# Patient Record
Sex: MALE | Race: WHITE | NOT HISPANIC OR LATINO | Employment: OTHER | URBAN - METROPOLITAN AREA
[De-identification: names, ages, dates, MRNs, and addresses within clinical notes are randomized per-mention and may not be internally consistent; named-entity substitution may affect disease eponyms.]

---

## 2018-05-01 ENCOUNTER — HOSPITAL ENCOUNTER (OUTPATIENT)
Dept: MEDSURG UNIT | Facility: HOSPITAL | Age: 83
End: 2018-05-03
Attending: INTERNAL MEDICINE | Admitting: INTERNAL MEDICINE

## 2018-05-01 LAB
ABS NEUT (OLG): 7.26 X10(3)/MCL (ref 2.1–9.2)
ALBUMIN SERPL-MCNC: 3.4 GM/DL (ref 3.4–5)
ALBUMIN/GLOB SERPL: 1 {RATIO}
ALP SERPL-CCNC: 117 UNIT/L (ref 45–117)
ALT SERPL-CCNC: 15 UNIT/L (ref 16–61)
APPEARANCE, UA: CLEAR
AST SERPL-CCNC: 20 UNIT/L (ref 15–37)
BACTERIA SPEC CULT: ABNORMAL
BASOPHILS # BLD AUTO: 0.08 X10(3)/MCL (ref 0–0.2)
BASOPHILS NFR BLD AUTO: 0.8 % (ref 0–0.9)
BILIRUB SERPL-MCNC: 0.6 MG/DL (ref 0.2–1)
BILIRUB UR QL STRIP: NEGATIVE
BILIRUBIN DIRECT+TOT PNL SERPL-MCNC: 0.2 MG/DL (ref 0–0.2)
BILIRUBIN DIRECT+TOT PNL SERPL-MCNC: 0.4 MG/DL (ref 0–1)
BUN SERPL-MCNC: 40 MG/DL (ref 7–18)
CALCIUM SERPL-MCNC: 8.6 MG/DL (ref 8.5–10.1)
CHLORIDE SERPL-SCNC: 106 MMOL/L (ref 98–107)
CO2 SERPL-SCNC: 26 MMOL/L (ref 21–32)
COLOR UR: YELLOW
CREAT SERPL-MCNC: 1.69 MG/DL (ref 0.7–1.3)
EOSINOPHIL # BLD AUTO: 0.33 X10(3)/MCL (ref 0–0.9)
EOSINOPHIL NFR BLD AUTO: 3.2 % (ref 0–6.5)
ERYTHROCYTE [DISTWIDTH] IN BLOOD BY AUTOMATED COUNT: 13.3 % (ref 11.5–17)
FLUAV AG NPH QL IA: NEGATIVE
FLUBV AG NPH QL IA: NEGATIVE
GLOBULIN SER-MCNC: 4 GM/DL (ref 2–4)
GLUCOSE (UA): NEGATIVE
GLUCOSE SERPL-MCNC: 88 MG/DL (ref 74–106)
HCT VFR BLD AUTO: 35.3 % (ref 42–52)
HGB BLD-MCNC: 11 GM/DL (ref 14–18)
HGB UR QL STRIP: NEGATIVE
IMM GRANULOCYTES # BLD AUTO: 0.06 10*3/UL (ref 0–0.02)
IMM GRANULOCYTES NFR BLD AUTO: 0.6 % (ref 0–0.43)
KETONES UR QL STRIP: NEGATIVE
LACTATE SERPL-SCNC: 1.2 MMOL/L (ref 0.4–2)
LEUKOCYTE ESTERASE UR QL STRIP: NEGATIVE
LIPASE SERPL-CCNC: 134 UNIT/L (ref 73–393)
LYMPHOCYTES # BLD AUTO: 2.16 X10(3)/MCL (ref 0.6–4.6)
LYMPHOCYTES NFR BLD AUTO: 21.2 % (ref 16.2–38.3)
MCH RBC QN AUTO: 30.1 PG (ref 27–31)
MCHC RBC AUTO-ENTMCNC: 31.2 GM/DL (ref 33–36)
MCV RBC AUTO: 96.7 FL (ref 80–94)
MONOCYTES # BLD AUTO: 0.32 X10(3)/MCL (ref 0.1–1.3)
MONOCYTES NFR BLD AUTO: 3.1 % (ref 4.7–11.3)
NEUTROPHILS # BLD AUTO: 7.26 X10(3)/MCL (ref 2.1–9.2)
NEUTROPHILS NFR BLD AUTO: 71.1 % (ref 49.1–73.4)
NITRITE UR QL STRIP: NEGATIVE
NRBC BLD AUTO-RTO: 0 % (ref 0–0.2)
PH UR STRIP: 5.5 [PH] (ref 5–7)
PLATELET # BLD AUTO: 315 X10(3)/MCL (ref 130–400)
PMV BLD AUTO: 10.6 FL (ref 7.4–10.4)
POTASSIUM SERPL-SCNC: 4.4 MMOL/L (ref 3.5–5.1)
PROT SERPL-MCNC: 7.5 GM/DL (ref 6.4–8.2)
PROT UR QL STRIP: ABNORMAL
RBC # BLD AUTO: 3.65 X10(6)/MCL (ref 4.7–6.1)
RBC #/AREA URNS HPF: ABNORMAL /HPF
SODIUM SERPL-SCNC: 139 MMOL/L (ref 136–145)
SP GR UR STRIP: 1.02 (ref 1–1.03)
SQUAMOUS EPITHELIAL, UA: ABNORMAL /LPF
UROBILINOGEN UR STRIP-ACNC: NEGATIVE
WBC # SPEC AUTO: 10.2 X10(3)/MCL (ref 4.5–11.5)
WBC #/AREA URNS HPF: 0 /[HPF]

## 2018-05-03 LAB
ABS NEUT (OLG): 3.59 X10(3)/MCL (ref 2.1–9.2)
BASOPHILS # BLD AUTO: 0.1 X10(3)/MCL (ref 0–0.2)
BASOPHILS NFR BLD AUTO: 1 %
BUN SERPL-MCNC: 24 MG/DL (ref 7–18)
CALCIUM SERPL-MCNC: 8.4 MG/DL (ref 8.5–10.1)
CHLORIDE SERPL-SCNC: 111 MMOL/L (ref 98–107)
CO2 SERPL-SCNC: 25 MMOL/L (ref 21–32)
CREAT SERPL-MCNC: 1.16 MG/DL (ref 0.7–1.3)
CREAT/UREA NIT SERPL: 20.7
EOSINOPHIL # BLD AUTO: 0.3 X10(3)/MCL (ref 0–0.9)
EOSINOPHIL NFR BLD AUTO: 5 %
ERYTHROCYTE [DISTWIDTH] IN BLOOD BY AUTOMATED COUNT: 13.6 % (ref 11.5–17)
GLUCOSE SERPL-MCNC: 91 MG/DL (ref 74–106)
HCT VFR BLD AUTO: 32.3 % (ref 42–52)
HGB BLD-MCNC: 10.2 GM/DL (ref 14–18)
LYMPHOCYTES # BLD AUTO: 1.3 X10(3)/MCL (ref 0.6–4.6)
LYMPHOCYTES NFR BLD AUTO: 22 %
MCH RBC QN AUTO: 30.9 PG (ref 27–31)
MCHC RBC AUTO-ENTMCNC: 31.6 GM/DL (ref 33–36)
MCV RBC AUTO: 97.9 FL (ref 80–94)
MONOCYTES # BLD AUTO: 0.7 X10(3)/MCL (ref 0.1–1.3)
MONOCYTES NFR BLD AUTO: 12 %
NEUTROPHILS # BLD AUTO: 3.59 X10(3)/MCL (ref 1.4–7.9)
NEUTROPHILS NFR BLD AUTO: 59 %
PLATELET # BLD AUTO: 290 X10(3)/MCL (ref 130–400)
PMV BLD AUTO: 10.2 FL (ref 9.4–12.4)
POTASSIUM SERPL-SCNC: 4.9 MMOL/L (ref 3.5–5.1)
RBC # BLD AUTO: 3.3 X10(6)/MCL (ref 4.7–6.1)
SODIUM SERPL-SCNC: 142 MMOL/L (ref 136–145)
WBC # SPEC AUTO: 6 X10(3)/MCL (ref 4.5–11.5)

## 2018-05-04 LAB — FINAL CULTURE: NORMAL

## 2018-05-08 LAB
FINAL CULTURE: NORMAL
FINAL CULTURE: NORMAL

## 2018-12-05 ENCOUNTER — HISTORICAL (OUTPATIENT)
Dept: ADMINISTRATIVE | Facility: HOSPITAL | Age: 83
End: 2018-12-05

## 2018-12-05 LAB
ABS NEUT (OLG): 5.22 X10(3)/MCL (ref 2.1–9.2)
ALBUMIN SERPL-MCNC: 3.8 GM/DL (ref 3.4–5)
ALBUMIN/GLOB SERPL: 1.4 {RATIO}
ALP SERPL-CCNC: 112 UNIT/L (ref 50–136)
ALT SERPL-CCNC: 18 UNIT/L (ref 12–78)
AST SERPL-CCNC: 20 UNIT/L (ref 15–37)
BASOPHILS # BLD AUTO: 0.1 X10(3)/MCL (ref 0–0.2)
BASOPHILS NFR BLD AUTO: 1 %
BILIRUB SERPL-MCNC: 0.7 MG/DL (ref 0.2–1)
BILIRUBIN DIRECT+TOT PNL SERPL-MCNC: 0.2 MG/DL (ref 0–0.2)
BILIRUBIN DIRECT+TOT PNL SERPL-MCNC: 0.5 MG/DL (ref 0–0.8)
BUN SERPL-MCNC: 19 MG/DL (ref 7–18)
CALCIUM SERPL-MCNC: 8.8 MG/DL (ref 8.5–10.1)
CHLORIDE SERPL-SCNC: 104 MMOL/L (ref 98–107)
CHOLEST SERPL-MCNC: 148 MG/DL (ref 0–200)
CHOLEST/HDLC SERPL: 2.7 {RATIO} (ref 0–5)
CO2 SERPL-SCNC: 28 MMOL/L (ref 21–32)
CREAT SERPL-MCNC: 1.15 MG/DL (ref 0.7–1.3)
EOSINOPHIL # BLD AUTO: 0.3 X10(3)/MCL (ref 0–0.9)
EOSINOPHIL NFR BLD AUTO: 3 %
ERYTHROCYTE [DISTWIDTH] IN BLOOD BY AUTOMATED COUNT: 13.9 % (ref 11.5–17)
GLOBULIN SER-MCNC: 2.8 GM/DL (ref 2.4–3.5)
GLUCOSE SERPL-MCNC: 87 MG/DL (ref 74–106)
HCT VFR BLD AUTO: 39.7 % (ref 42–52)
HDLC SERPL-MCNC: 55 MG/DL (ref 35–60)
HGB BLD-MCNC: 12 GM/DL (ref 14–18)
LDLC SERPL CALC-MCNC: 72 MG/DL (ref 0–129)
LYMPHOCYTES # BLD AUTO: 2.2 X10(3)/MCL (ref 0.6–4.6)
LYMPHOCYTES NFR BLD AUTO: 26 %
MCH RBC QN AUTO: 29.6 PG (ref 27–31)
MCHC RBC AUTO-ENTMCNC: 30.2 GM/DL (ref 33–36)
MCV RBC AUTO: 97.8 FL (ref 80–94)
MONOCYTES # BLD AUTO: 0.7 X10(3)/MCL (ref 0.1–1.3)
MONOCYTES NFR BLD AUTO: 8 %
NEUTROPHILS # BLD AUTO: 5.22 X10(3)/MCL (ref 2.1–9.2)
NEUTROPHILS NFR BLD AUTO: 61 %
PLATELET # BLD AUTO: 348 X10(3)/MCL (ref 130–400)
PMV BLD AUTO: 10.6 FL (ref 9.4–12.4)
POTASSIUM SERPL-SCNC: 5.6 MMOL/L (ref 3.5–5.1)
PROT SERPL-MCNC: 6.6 GM/DL (ref 6.4–8.2)
PSA SERPL-MCNC: <0.01 NG/ML (ref 0–4)
RBC # BLD AUTO: 4.06 X10(6)/MCL (ref 4.7–6.1)
SODIUM SERPL-SCNC: 140 MMOL/L (ref 136–145)
TRIGL SERPL-MCNC: 106 MG/DL (ref 30–150)
VLDLC SERPL CALC-MCNC: 21 MG/DL
WBC # SPEC AUTO: 8.5 X10(3)/MCL (ref 4.5–11.5)

## 2019-07-09 ENCOUNTER — HOSPITAL ENCOUNTER (OUTPATIENT)
Dept: ONCOLOGY | Facility: HOSPITAL | Age: 84
End: 2019-07-12
Attending: INTERNAL MEDICINE | Admitting: INTERNAL MEDICINE

## 2019-07-09 LAB
ABS NEUT (OLG): 8.58 X10(3)/MCL (ref 2.1–9.2)
ALBUMIN SERPL-MCNC: 3.6 GM/DL (ref 3.4–5)
ALBUMIN/GLOB SERPL: 1.2 {RATIO}
ALP SERPL-CCNC: 95 UNIT/L (ref 50–136)
ALT SERPL-CCNC: 20 UNIT/L (ref 12–78)
AST SERPL-CCNC: 20 UNIT/L (ref 15–37)
BASOPHILS # BLD AUTO: 0.1 X10(3)/MCL (ref 0–0.2)
BASOPHILS NFR BLD AUTO: 1 %
BILIRUB SERPL-MCNC: 0.3 MG/DL (ref 0.2–1)
BILIRUBIN DIRECT+TOT PNL SERPL-MCNC: 0.1 MG/DL (ref 0–0.8)
BILIRUBIN DIRECT+TOT PNL SERPL-MCNC: 0.2 MG/DL (ref 0–0.2)
BUN SERPL-MCNC: 23 MG/DL (ref 7–18)
CALCIUM SERPL-MCNC: 8.6 MG/DL (ref 8.5–10.1)
CHLORIDE SERPL-SCNC: 104 MMOL/L (ref 98–107)
CO2 SERPL-SCNC: 27 MMOL/L (ref 21–32)
CREAT SERPL-MCNC: 1.45 MG/DL (ref 0.7–1.3)
EOSINOPHIL # BLD AUTO: 0.6 X10(3)/MCL (ref 0–0.9)
EOSINOPHIL NFR BLD AUTO: 5 %
ERYTHROCYTE [DISTWIDTH] IN BLOOD BY AUTOMATED COUNT: 14.2 % (ref 11.5–17)
GLOBULIN SER-MCNC: 3 GM/DL (ref 2.4–3.5)
GLUCOSE SERPL-MCNC: 107 MG/DL (ref 74–106)
HCT VFR BLD AUTO: 40 % (ref 42–52)
HGB BLD-MCNC: 12.3 GM/DL (ref 14–18)
LYMPHOCYTES # BLD AUTO: 1.4 X10(3)/MCL (ref 0.6–4.6)
LYMPHOCYTES NFR BLD AUTO: 12 %
MCH RBC QN AUTO: 30.4 PG (ref 27–31)
MCHC RBC AUTO-ENTMCNC: 30.8 GM/DL (ref 33–36)
MCV RBC AUTO: 99 FL (ref 80–94)
MONOCYTES # BLD AUTO: 1 X10(3)/MCL (ref 0.1–1.3)
MONOCYTES NFR BLD AUTO: 8 %
NEUTROPHILS # BLD AUTO: 8.58 X10(3)/MCL (ref 2.1–9.2)
NEUTROPHILS NFR BLD AUTO: 72 %
PLATELET # BLD AUTO: 328 X10(3)/MCL (ref 130–400)
PMV BLD AUTO: 10.7 FL (ref 9.4–12.4)
POTASSIUM SERPL-SCNC: 4.7 MMOL/L (ref 3.5–5.1)
PROT SERPL-MCNC: 6.6 GM/DL (ref 6.4–8.2)
RBC # BLD AUTO: 4.04 X10(6)/MCL (ref 4.7–6.1)
SODIUM SERPL-SCNC: 138 MMOL/L (ref 136–145)
WBC # SPEC AUTO: 11.8 X10(3)/MCL (ref 4.5–11.5)

## 2019-07-10 LAB
APTT PPP: 30 SECOND(S) (ref 24.2–33.9)
INR PPP: 1.1 (ref 0–1.3)
PROTHROMBIN TIME: 14.7 SECOND(S) (ref 12–14)

## 2019-07-11 LAB
ABS NEUT (OLG): 5.83 X10(3)/MCL (ref 2.1–9.2)
ALBUMIN SERPL-MCNC: 3.1 GM/DL (ref 3.4–5)
ALBUMIN/GLOB SERPL: 1.1 RATIO (ref 1.1–2)
ALP SERPL-CCNC: 88 UNIT/L (ref 50–136)
ALT SERPL-CCNC: 15 UNIT/L (ref 12–78)
AST SERPL-CCNC: 15 UNIT/L (ref 15–37)
BASOPHILS # BLD AUTO: 0.1 X10(3)/MCL (ref 0–0.2)
BASOPHILS NFR BLD AUTO: 1 %
BILIRUB SERPL-MCNC: 0.4 MG/DL (ref 0.2–1)
BILIRUBIN DIRECT+TOT PNL SERPL-MCNC: 0.2 MG/DL (ref 0–0.5)
BILIRUBIN DIRECT+TOT PNL SERPL-MCNC: 0.2 MG/DL (ref 0–0.8)
BUN SERPL-MCNC: 25 MG/DL (ref 7–18)
CALCIUM SERPL-MCNC: 8.8 MG/DL (ref 8.5–10.1)
CHLORIDE SERPL-SCNC: 104 MMOL/L (ref 98–107)
CO2 SERPL-SCNC: 30 MMOL/L (ref 21–32)
CREAT SERPL-MCNC: 1.21 MG/DL (ref 0.7–1.3)
EOSINOPHIL # BLD AUTO: 0.6 X10(3)/MCL (ref 0–0.9)
EOSINOPHIL NFR BLD AUTO: 6 %
ERYTHROCYTE [DISTWIDTH] IN BLOOD BY AUTOMATED COUNT: 14.1 % (ref 11.5–17)
GLOBULIN SER-MCNC: 2.8 GM/DL (ref 2.4–3.5)
GLUCOSE SERPL-MCNC: 91 MG/DL (ref 74–106)
HCT VFR BLD AUTO: 40.1 % (ref 42–52)
HGB BLD-MCNC: 12 GM/DL (ref 14–18)
LYMPHOCYTES # BLD AUTO: 1.7 X10(3)/MCL (ref 0.6–4.6)
LYMPHOCYTES NFR BLD AUTO: 19 %
MCH RBC QN AUTO: 29.8 PG (ref 27–31)
MCHC RBC AUTO-ENTMCNC: 29.9 GM/DL (ref 33–36)
MCV RBC AUTO: 99.5 FL (ref 80–94)
MONOCYTES # BLD AUTO: 0.9 X10(3)/MCL (ref 0.1–1.3)
MONOCYTES NFR BLD AUTO: 10 %
NEUTROPHILS # BLD AUTO: 5.83 X10(3)/MCL (ref 2.1–9.2)
NEUTROPHILS NFR BLD AUTO: 63 %
PLATELET # BLD AUTO: 305 X10(3)/MCL (ref 130–400)
PMV BLD AUTO: 10.5 FL (ref 9.4–12.4)
POTASSIUM SERPL-SCNC: 5.5 MMOL/L (ref 3.5–5.1)
PROT SERPL-MCNC: 5.9 GM/DL (ref 6.4–8.2)
RBC # BLD AUTO: 4.03 X10(6)/MCL (ref 4.7–6.1)
SODIUM SERPL-SCNC: 140 MMOL/L (ref 136–145)
WBC # SPEC AUTO: 9.2 X10(3)/MCL (ref 4.5–11.5)

## 2020-03-05 ENCOUNTER — HISTORICAL (OUTPATIENT)
Dept: ADMINISTRATIVE | Facility: HOSPITAL | Age: 85
End: 2020-03-05

## 2020-03-05 LAB
ABS NEUT (OLG): 5.49 X10(3)/MCL (ref 2.1–9.2)
BASOPHILS # BLD AUTO: 0.2 X10(3)/MCL (ref 0–0.2)
BASOPHILS NFR BLD AUTO: 1.7 %
EOSINOPHIL # BLD AUTO: 0.6 X10(3)/MCL (ref 0–0.9)
EOSINOPHIL NFR BLD AUTO: 6.1 %
ERYTHROCYTE [DISTWIDTH] IN BLOOD BY AUTOMATED COUNT: 13.7 % (ref 11.5–17)
FERRITIN SERPL-MCNC: 175.5 NG/ML (ref 8–388)
HCT VFR BLD AUTO: 39.7 % (ref 42–52)
HGB BLD-MCNC: 12.2 GM/DL (ref 14–18)
IRON SATN MFR SERPL: 20.4 % (ref 20–50)
IRON SERPL-MCNC: 60 MCG/DL (ref 50–175)
LYMPHOCYTES # BLD AUTO: 2.5 X10(3)/MCL (ref 0.6–4.6)
LYMPHOCYTES NFR BLD AUTO: 25.6 %
MCH RBC QN AUTO: 30 PG (ref 27–31)
MCHC RBC AUTO-ENTMCNC: 30.7 GM/DL (ref 33–36)
MCV RBC AUTO: 97.8 FL (ref 80–94)
MONOCYTES # BLD AUTO: 1 X10(3)/MCL (ref 0.1–1.3)
MONOCYTES NFR BLD AUTO: 9.9 %
NEUTROPHILS # BLD AUTO: 5.5 X10(3)/MCL (ref 2.1–9.2)
NEUTROPHILS NFR BLD AUTO: 55.9 %
PLATELET # BLD AUTO: 434 X10(3)/MCL (ref 130–400)
PMV BLD AUTO: 9.1 FL (ref 9.4–12.4)
RBC # BLD AUTO: 4.06 X10(6)/MCL (ref 4.7–6.1)
TIBC SERPL-MCNC: 294 MCG/DL (ref 250–450)
TRANSFERRIN SERPL-MCNC: 213 MG/DL (ref 200–360)
WBC # SPEC AUTO: 9.8 X10(3)/MCL (ref 4.5–11.5)

## 2020-10-07 ENCOUNTER — HISTORICAL (OUTPATIENT)
Dept: ADMINISTRATIVE | Facility: HOSPITAL | Age: 85
End: 2020-10-07

## 2020-10-07 LAB
ALBUMIN SERPL-MCNC: 4.6 G/DL (ref 3.6–4.6)
ALBUMIN/GLOB SERPL: 1.9 {RATIO} (ref 1.2–2.2)
ALP SERPL-CCNC: 148 IU/L (ref 39–117)
ALT SERPL-CCNC: 11 IU/L (ref 0–44)
AST SERPL-CCNC: 18 IU/L (ref 0–40)
BASOPHILS # BLD AUTO: 0.1 X10E3/UL (ref 0–0.2)
BASOPHILS NFR BLD AUTO: 2 %
BILIRUB SERPL-MCNC: 0.4 MG/DL (ref 0–1.2)
BUN SERPL-MCNC: 30 MG/DL (ref 8–27)
CALCIUM SERPL-MCNC: 9.2 MG/DL (ref 8.6–10.2)
CHLORIDE SERPL-SCNC: 99 MMOL/L (ref 96–106)
CO2 SERPL-SCNC: 21 MMOL/L (ref 20–29)
CREAT SERPL-MCNC: 1.37 MG/DL (ref 0.76–1.27)
CREAT/UREA NIT SERPL: 22 (ref 10–24)
EOSINOPHIL # BLD AUTO: 0.6 X10E3/UL (ref 0–0.4)
EOSINOPHIL NFR BLD AUTO: 7 %
ERYTHROCYTE [DISTWIDTH] IN BLOOD BY AUTOMATED COUNT: 14.4 % (ref 11.6–15.4)
GLOBULIN SER-MCNC: 2.4 G/DL (ref 1.5–4.5)
GLUCOSE SERPL-MCNC: 96 MG/DL (ref 65–99)
HCT VFR BLD AUTO: 39.6 % (ref 37.5–51)
HGB BLD-MCNC: 12.5 G/DL (ref 13–17.7)
LYMPHOCYTES # BLD AUTO: 2.5 X10E3/UL (ref 0.7–3.1)
LYMPHOCYTES NFR BLD AUTO: 28 %
MCH RBC QN AUTO: 30.8 PG (ref 26.6–33)
MCHC RBC AUTO-ENTMCNC: 31.6 G/DL (ref 31.5–35.7)
MCV RBC AUTO: 98 FL (ref 79–97)
MONOCYTES # BLD AUTO: 1.1 X10E3/UL (ref 0.1–0.9)
MONOCYTES NFR BLD AUTO: 12 %
NEUTROPHILS # BLD AUTO: 4.6 X10E3/UL (ref 1.4–7)
NEUTROPHILS NFR BLD AUTO: 51 %
PLATELET # BLD AUTO: 734 X10E3/UL (ref 150–450)
POTASSIUM SERPL-SCNC: 6.1 MMOL/L (ref 3.5–5.2)
PROT SERPL-MCNC: 7 G/DL (ref 6–8.5)
RBC # BLD AUTO: 4.06 X10(6)/MCL (ref 4.14–5.8)
SODIUM SERPL-SCNC: 135 MMOL/L (ref 134–144)
WBC # SPEC AUTO: 9 X10E3/UL (ref 3.4–10.8)

## 2020-10-20 ENCOUNTER — HISTORICAL (OUTPATIENT)
Dept: ADMINISTRATIVE | Facility: HOSPITAL | Age: 85
End: 2020-10-20

## 2020-10-20 LAB
BUN SERPL-MCNC: 26.4 MG/DL (ref 8.4–25.7)
CALCIUM SERPL-MCNC: 8.6 MG/DL (ref 8.8–10)
CHLORIDE SERPL-SCNC: 102 MMOL/L (ref 98–107)
CO2 SERPL-SCNC: 21 MMOL/L (ref 23–31)
CREAT SERPL-MCNC: 1.3 MG/DL (ref 0.72–1.25)
CREAT/UREA NIT SERPL: 20
GLUCOSE SERPL-MCNC: 129 MG/DL (ref 82–115)
POTASSIUM SERPL-SCNC: 6 MMOL/L (ref 3.5–5.1)
SODIUM SERPL-SCNC: 132 MMOL/L (ref 136–145)

## 2020-10-21 ENCOUNTER — HISTORICAL (OUTPATIENT)
Dept: RADIOLOGY | Facility: HOSPITAL | Age: 85
End: 2020-10-21

## 2020-10-21 LAB
BUN SERPL-MCNC: 27 MG/DL (ref 8.4–25.7)
CALCIUM SERPL-MCNC: 8.8 MG/DL (ref 8.8–10)
CHLORIDE SERPL-SCNC: 99 MMOL/L (ref 98–107)
CO2 SERPL-SCNC: 24 MMOL/L (ref 23–31)
CREAT SERPL-MCNC: 1.19 MG/DL (ref 0.73–1.18)
CREAT/UREA NIT SERPL: 23
GLUCOSE SERPL-MCNC: 73 MG/DL (ref 82–115)
POTASSIUM SERPL-SCNC: 6.1 MMOL/L (ref 3.5–5.1)
SODIUM SERPL-SCNC: 133 MMOL/L (ref 136–145)

## 2020-10-22 ENCOUNTER — HISTORICAL (OUTPATIENT)
Dept: ADMINISTRATIVE | Facility: HOSPITAL | Age: 85
End: 2020-10-22

## 2020-10-22 LAB
BILIRUB SERPL-MCNC: NORMAL MG/DL
BLOOD URINE, POC: NEGATIVE
BUN SERPL-MCNC: 30.1 MG/DL (ref 8.4–25.7)
CALCIUM SERPL-MCNC: 8.8 MG/DL (ref 8.8–10)
CHLORIDE SERPL-SCNC: 97 MMOL/L (ref 98–107)
CLARITY, POC UA: CLEAR
CO2 SERPL-SCNC: 26 MMOL/L (ref 23–31)
COLOR, POC UA: NORMAL
CREAT SERPL-MCNC: 1.27 MG/DL (ref 0.72–1.25)
CREAT/UREA NIT SERPL: 24
GLUCOSE SERPL-MCNC: 78 MG/DL (ref 82–115)
GLUCOSE UR QL STRIP: NEGATIVE
KETONES UR QL STRIP: NEGATIVE
LEUKOCYTE EST, POC UA: NEGATIVE
NITRITE, POC UA: NEGATIVE
PH, POC UA: 5.5
POTASSIUM SERPL-SCNC: 5.7 MMOL/L (ref 3.5–5.1)
PROTEIN, POC: NEGATIVE
SODIUM SERPL-SCNC: 137 MMOL/L (ref 136–145)
SPECIFIC GRAVITY, POC UA: NORMAL
UROBILINOGEN, POC UA: NORMAL

## 2020-10-23 ENCOUNTER — HISTORICAL (OUTPATIENT)
Dept: ADMINISTRATIVE | Facility: HOSPITAL | Age: 85
End: 2020-10-23

## 2020-10-23 ENCOUNTER — HISTORICAL (OUTPATIENT)
Dept: RESPIRATORY THERAPY | Facility: HOSPITAL | Age: 85
End: 2020-10-23

## 2020-10-23 LAB
BUN SERPL-MCNC: 30.5 MG/DL (ref 8.4–25.7)
CALCIUM SERPL-MCNC: 8.4 MG/DL (ref 8.8–10)
CHLORIDE SERPL-SCNC: 98 MMOL/L (ref 98–107)
CO2 SERPL-SCNC: 27 MMOL/L (ref 23–31)
CREAT SERPL-MCNC: 1.44 MG/DL (ref 0.72–1.25)
CREAT/UREA NIT SERPL: 21
GLUCOSE SERPL-MCNC: 95 MG/DL (ref 82–115)
POTASSIUM SERPL-SCNC: 5 MMOL/L (ref 3.5–5.1)
SODIUM SERPL-SCNC: 136 MMOL/L (ref 136–145)

## 2020-11-02 ENCOUNTER — HISTORICAL (OUTPATIENT)
Dept: ADMINISTRATIVE | Facility: HOSPITAL | Age: 85
End: 2020-11-02

## 2020-11-02 LAB
BUN SERPL-MCNC: 34.7 MG/DL (ref 8.4–25.7)
CALCIUM SERPL-MCNC: 9.2 MG/DL (ref 8.8–10)
CHLORIDE SERPL-SCNC: 100 MMOL/L (ref 98–107)
CO2 SERPL-SCNC: 25 MMOL/L (ref 23–31)
CREAT SERPL-MCNC: 1.26 MG/DL (ref 0.72–1.25)
CREAT/UREA NIT SERPL: 28
GLUCOSE SERPL-MCNC: 93 MG/DL (ref 82–115)
POTASSIUM SERPL-SCNC: 5.8 MMOL/L (ref 3.5–5.1)
SODIUM SERPL-SCNC: 137 MMOL/L (ref 136–145)

## 2020-11-04 ENCOUNTER — HISTORICAL (OUTPATIENT)
Dept: ADMINISTRATIVE | Facility: HOSPITAL | Age: 85
End: 2020-11-04

## 2020-11-04 LAB
ABS NEUT (OLG): 5.97 X10(3)/MCL (ref 2.1–9.2)
ALBUMIN SERPL-MCNC: 3.6 GM/DL (ref 3.4–4.8)
ALBUMIN/GLOB SERPL: 1.4 RATIO (ref 1.1–2)
ALP SERPL-CCNC: 117 UNIT/L (ref 40–150)
ALT SERPL-CCNC: 16 UNIT/L (ref 0–55)
AST SERPL-CCNC: 18 UNIT/L (ref 5–34)
BASOPHILS # BLD AUTO: 0.15 X10(3)/MCL (ref 0–0.2)
BASOPHILS NFR BLD AUTO: 1.5 % (ref 0–0.9)
BILIRUB SERPL-MCNC: 0.4 MG/DL (ref 0.2–1.2)
BILIRUBIN DIRECT+TOT PNL SERPL-MCNC: 0.2 MG/DL (ref 0–0.5)
BILIRUBIN DIRECT+TOT PNL SERPL-MCNC: 0.2 MG/DL (ref 0–0.8)
BUN SERPL-MCNC: 37.6 MG/DL (ref 8.4–25.7)
CALCIUM SERPL-MCNC: 9.1 MG/DL (ref 8.8–10)
CHLORIDE SERPL-SCNC: 101 MMOL/L (ref 98–107)
CHOLEST SERPL-MCNC: 147 MG/DL
CHOLEST/HDLC SERPL: 3 {RATIO} (ref 0–5)
CO2 SERPL-SCNC: 30 MMOL/L (ref 23–31)
CREAT SERPL-MCNC: 1.15 MG/DL (ref 0.72–1.25)
DEPRECATED CALCIDIOL+CALCIFEROL SERPL-MC: 24.3 NG/ML (ref 6.6–49.9)
EOSINOPHIL # BLD AUTO: 0.63 X10(3)/MCL (ref 0–0.9)
EOSINOPHIL NFR BLD AUTO: 6.5 % (ref 0–6.5)
ERYTHROCYTE [DISTWIDTH] IN BLOOD BY AUTOMATED COUNT: 14.9 % (ref 11.5–17)
FERRITIN SERPL-MCNC: 225.94 NG/ML (ref 21.81–274.66)
GLOBULIN SER-MCNC: 2.5 GM/DL (ref 2.4–3.5)
GLUCOSE SERPL-MCNC: 92 MG/DL (ref 82–115)
HCT VFR BLD AUTO: 36 % (ref 42–52)
HDLC SERPL-MCNC: 51 MG/DL (ref 40–60)
HGB BLD-MCNC: 11.2 GM/DL (ref 14–18)
IMM GRANULOCYTES # BLD AUTO: 0.1 10*3/UL (ref 0–0.02)
IMM GRANULOCYTES NFR BLD AUTO: 1 % (ref 0–0.43)
IRON SATN MFR SERPL: 28 % (ref 20–50)
IRON SERPL-MCNC: 65 UG/DL (ref 65–175)
LDLC SERPL CALC-MCNC: 86 MG/DL (ref 50–140)
LYMPHOCYTES # BLD AUTO: 1.98 X10(3)/MCL (ref 0.6–4.6)
LYMPHOCYTES NFR BLD AUTO: 20.3 % (ref 16.2–38.3)
MCH RBC QN AUTO: 29.9 PG (ref 27–31)
MCHC RBC AUTO-ENTMCNC: 31.1 GM/DL (ref 33–36)
MCV RBC AUTO: 96 FL (ref 80–94)
MONOCYTES # BLD AUTO: 0.92 X10(3)/MCL (ref 0.1–1.3)
MONOCYTES NFR BLD AUTO: 9.4 % (ref 4.7–11.3)
NEUTROPHILS # BLD AUTO: 5.97 X10(3)/MCL (ref 2.1–9.2)
NEUTROPHILS NFR BLD AUTO: 61.3 % (ref 49.1–73.4)
NRBC BLD AUTO-RTO: 0 % (ref 0–0.2)
PLATELET # BLD AUTO: 640 X10(3)/MCL (ref 130–400)
PMV BLD AUTO: 10.2 FL (ref 7.4–10.4)
POTASSIUM SERPL-SCNC: 6.1 MMOL/L (ref 3.5–5.1)
PROT SERPL-MCNC: 6.1 GM/DL (ref 5.8–7.6)
RBC # BLD AUTO: 3.75 X10(6)/MCL (ref 4.7–6.1)
SODIUM SERPL-SCNC: 138 MMOL/L (ref 136–145)
TIBC SERPL-MCNC: 167 UG/DL (ref 69–240)
TIBC SERPL-MCNC: 232 UG/DL (ref 250–450)
TRANSFERRIN SERPL-MCNC: 201 MG/DL
TRIGL SERPL-MCNC: 52 MG/DL (ref 0–150)
TSH SERPL-ACNC: 3.99 UIU/ML (ref 0.35–4.94)
VLDLC SERPL CALC-MCNC: 10 MG/DL
WBC # SPEC AUTO: 9.8 X10(3)/MCL (ref 4.5–11.5)

## 2020-11-05 ENCOUNTER — HISTORICAL (OUTPATIENT)
Dept: ADMINISTRATIVE | Facility: HOSPITAL | Age: 85
End: 2020-11-05

## 2020-11-05 LAB
BUN SERPL-MCNC: 34.2 MG/DL (ref 8.4–25.7)
CALCIUM SERPL-MCNC: 9 MG/DL (ref 8.8–10)
CHLORIDE SERPL-SCNC: 97 MMOL/L (ref 98–107)
CO2 SERPL-SCNC: 30 MMOL/L (ref 23–31)
CREAT SERPL-MCNC: 1.14 MG/DL (ref 0.72–1.25)
CREAT/UREA NIT SERPL: 30
GLUCOSE SERPL-MCNC: 84 MG/DL (ref 82–115)
POTASSIUM SERPL-SCNC: 6.1 MMOL/L (ref 3.5–5.1)
PSA SERPL-MCNC: <0.02 NG/ML
SODIUM SERPL-SCNC: 137 MMOL/L (ref 136–145)

## 2020-11-06 ENCOUNTER — HISTORICAL (OUTPATIENT)
Dept: ADMINISTRATIVE | Facility: HOSPITAL | Age: 85
End: 2020-11-06

## 2020-11-06 LAB
BUN SERPL-MCNC: 26.3 MG/DL (ref 8.4–25.7)
CALCIUM SERPL-MCNC: 8.9 MG/DL (ref 8.8–10)
CHLORIDE SERPL-SCNC: 96 MMOL/L (ref 98–107)
CO2 SERPL-SCNC: 31 MMOL/L (ref 23–31)
CREAT SERPL-MCNC: 1.21 MG/DL (ref 0.72–1.25)
CREAT/UREA NIT SERPL: 22
GLUCOSE SERPL-MCNC: 90 MG/DL (ref 82–115)
POTASSIUM SERPL-SCNC: 5.3 MMOL/L (ref 3.5–5.1)
SODIUM SERPL-SCNC: 138 MMOL/L (ref 136–145)

## 2020-11-12 ENCOUNTER — HISTORICAL (OUTPATIENT)
Dept: ADMINISTRATIVE | Facility: HOSPITAL | Age: 85
End: 2020-11-12

## 2020-11-12 LAB
BUN SERPL-MCNC: 31.2 MG/DL (ref 8.4–25.7)
CALCIUM SERPL-MCNC: 8.7 MG/DL (ref 8.8–10)
CHLORIDE SERPL-SCNC: 94 MMOL/L (ref 98–107)
CO2 SERPL-SCNC: 28 MMOL/L (ref 23–31)
CREAT SERPL-MCNC: 1.07 MG/DL (ref 0.72–1.25)
CREAT/UREA NIT SERPL: 29
GLUCOSE SERPL-MCNC: 83 MG/DL (ref 82–115)
POTASSIUM SERPL-SCNC: 5.3 MMOL/L (ref 3.5–5.1)
SODIUM SERPL-SCNC: 133 MMOL/L (ref 136–145)

## 2020-11-16 ENCOUNTER — HISTORICAL (OUTPATIENT)
Dept: ADMINISTRATIVE | Facility: HOSPITAL | Age: 85
End: 2020-11-16

## 2020-11-16 LAB
BUN SERPL-MCNC: 17.1 MG/DL (ref 8.4–25.7)
CALCIUM SERPL-MCNC: 9 MG/DL (ref 8.8–10)
CHLORIDE SERPL-SCNC: 101 MMOL/L (ref 98–107)
CO2 SERPL-SCNC: 30 MMOL/L (ref 23–31)
CREAT SERPL-MCNC: 1 MG/DL (ref 0.72–1.25)
CREAT/UREA NIT SERPL: 17
GLUCOSE SERPL-MCNC: 102 MG/DL (ref 82–115)
POTASSIUM SERPL-SCNC: 5.5 MMOL/L (ref 3.5–5.1)
SODIUM SERPL-SCNC: 138 MMOL/L (ref 136–145)

## 2020-11-17 ENCOUNTER — HISTORICAL (OUTPATIENT)
Dept: ADMINISTRATIVE | Facility: HOSPITAL | Age: 85
End: 2020-11-17

## 2020-11-17 LAB
BNP BLD-MCNC: 133 PG/ML
BUN SERPL-MCNC: 17.7 MG/DL (ref 8.4–25.7)
CALCIUM SERPL-MCNC: 8.9 MG/DL (ref 8.8–10)
CHLORIDE SERPL-SCNC: 100 MMOL/L (ref 98–107)
CO2 SERPL-SCNC: 29 MMOL/L (ref 23–31)
CREAT SERPL-MCNC: 0.99 MG/DL (ref 0.72–1.25)
CREAT/UREA NIT SERPL: 18
GLUCOSE SERPL-MCNC: 96 MG/DL (ref 82–115)
POTASSIUM SERPL-SCNC: 5.5 MMOL/L (ref 3.5–5.1)
SODIUM SERPL-SCNC: 138 MMOL/L (ref 136–145)

## 2020-11-18 ENCOUNTER — HISTORICAL (OUTPATIENT)
Dept: ADMINISTRATIVE | Facility: HOSPITAL | Age: 85
End: 2020-11-18

## 2020-11-18 LAB
BUN SERPL-MCNC: 29.5 MG/DL (ref 8.4–25.7)
CALCIUM SERPL-MCNC: 8.7 MG/DL (ref 8.8–10)
CHLORIDE SERPL-SCNC: 97 MMOL/L (ref 98–107)
CO2 SERPL-SCNC: 29 MMOL/L (ref 23–31)
CREAT SERPL-MCNC: 1.34 MG/DL (ref 0.72–1.25)
CREAT/UREA NIT SERPL: 22
GLUCOSE SERPL-MCNC: 87 MG/DL (ref 82–115)
POTASSIUM SERPL-SCNC: 5.2 MMOL/L (ref 3.5–5.1)
SODIUM SERPL-SCNC: 136 MMOL/L (ref 136–145)

## 2020-11-19 ENCOUNTER — HISTORICAL (OUTPATIENT)
Dept: ADMINISTRATIVE | Facility: HOSPITAL | Age: 85
End: 2020-11-19

## 2020-11-19 LAB
BUN SERPL-MCNC: 33.3 MG/DL (ref 8.4–25.7)
CALCIUM SERPL-MCNC: 8.8 MG/DL (ref 8.8–10)
CHLORIDE SERPL-SCNC: 98 MMOL/L (ref 98–107)
CO2 SERPL-SCNC: 30 MMOL/L (ref 23–31)
CREAT SERPL-MCNC: 1.24 MG/DL (ref 0.72–1.25)
CREAT/UREA NIT SERPL: 27
GLUCOSE SERPL-MCNC: 93 MG/DL (ref 82–115)
POTASSIUM SERPL-SCNC: 5.3 MMOL/L (ref 3.5–5.1)
SODIUM SERPL-SCNC: 137 MMOL/L (ref 136–145)

## 2020-11-20 ENCOUNTER — HISTORICAL (OUTPATIENT)
Dept: ADMINISTRATIVE | Facility: HOSPITAL | Age: 85
End: 2020-11-20

## 2020-11-20 LAB
BUN SERPL-MCNC: 36.5 MG/DL (ref 8.4–25.7)
CALCIUM SERPL-MCNC: 9.1 MG/DL (ref 8.8–10)
CHLORIDE SERPL-SCNC: 98 MMOL/L (ref 98–107)
CO2 SERPL-SCNC: 28 MMOL/L (ref 23–31)
CREAT SERPL-MCNC: 1.29 MG/DL (ref 0.72–1.25)
CREAT/UREA NIT SERPL: 28
GLUCOSE SERPL-MCNC: 90 MG/DL (ref 82–115)
POTASSIUM SERPL-SCNC: 5.2 MMOL/L (ref 3.5–5.1)
SODIUM SERPL-SCNC: 137 MMOL/L (ref 136–145)

## 2020-11-23 ENCOUNTER — HISTORICAL (OUTPATIENT)
Dept: ADMINISTRATIVE | Facility: HOSPITAL | Age: 85
End: 2020-11-23

## 2020-11-23 LAB
BUN SERPL-MCNC: 36 MG/DL (ref 8.4–25.7)
CALCIUM SERPL-MCNC: 9 MG/DL (ref 8.8–10)
CHLORIDE SERPL-SCNC: 96 MMOL/L (ref 98–107)
CO2 SERPL-SCNC: 25 MMOL/L (ref 23–31)
CREAT SERPL-MCNC: 1.43 MG/DL (ref 0.72–1.25)
CREAT/UREA NIT SERPL: 25
GLUCOSE SERPL-MCNC: 89 MG/DL (ref 82–115)
POTASSIUM SERPL-SCNC: 5.2 MMOL/L (ref 3.5–5.1)
SODIUM SERPL-SCNC: 134 MMOL/L (ref 136–145)

## 2020-11-24 ENCOUNTER — HISTORICAL (OUTPATIENT)
Dept: ADMINISTRATIVE | Facility: HOSPITAL | Age: 85
End: 2020-11-24

## 2020-11-24 LAB
BUN SERPL-MCNC: 34.1 MG/DL (ref 8.4–25.7)
CALCIUM SERPL-MCNC: 8.8 MG/DL (ref 8.8–10)
CHLORIDE SERPL-SCNC: 98 MMOL/L (ref 98–107)
CO2 SERPL-SCNC: 26 MMOL/L (ref 23–31)
CREAT SERPL-MCNC: 1.21 MG/DL (ref 0.72–1.25)
CREAT/UREA NIT SERPL: 28
GLUCOSE SERPL-MCNC: 80 MG/DL (ref 82–115)
POTASSIUM SERPL-SCNC: 5.7 MMOL/L (ref 3.5–5.1)
SODIUM SERPL-SCNC: 135 MMOL/L (ref 136–145)

## 2020-12-01 ENCOUNTER — HISTORICAL (OUTPATIENT)
Dept: ADMINISTRATIVE | Facility: HOSPITAL | Age: 85
End: 2020-12-01

## 2020-12-01 LAB
BUN SERPL-MCNC: 31.4 MG/DL (ref 8.4–25.7)
CALCIUM SERPL-MCNC: 9.5 MG/DL (ref 8.8–10)
CHLORIDE SERPL-SCNC: 96 MMOL/L (ref 98–107)
CO2 SERPL-SCNC: 26 MMOL/L (ref 23–31)
CREAT SERPL-MCNC: 1.31 MG/DL (ref 0.72–1.25)
CREAT/UREA NIT SERPL: 24
GLUCOSE SERPL-MCNC: 104 MG/DL (ref 82–115)
POTASSIUM SERPL-SCNC: 5.5 MMOL/L (ref 3.5–5.1)
SODIUM SERPL-SCNC: 134 MMOL/L (ref 136–145)

## 2020-12-02 ENCOUNTER — HISTORICAL (OUTPATIENT)
Dept: ADMINISTRATIVE | Facility: HOSPITAL | Age: 85
End: 2020-12-02

## 2020-12-02 LAB
BUN SERPL-MCNC: 27.3 MG/DL (ref 8.4–25.7)
CALCIUM SERPL-MCNC: 9.2 MG/DL (ref 8.8–10)
CHLORIDE SERPL-SCNC: 98 MMOL/L (ref 98–107)
CO2 SERPL-SCNC: 26 MMOL/L (ref 23–31)
CREAT SERPL-MCNC: 1.18 MG/DL (ref 0.72–1.25)
CREAT/UREA NIT SERPL: 23
GLUCOSE SERPL-MCNC: 92 MG/DL (ref 82–115)
POTASSIUM SERPL-SCNC: 5.9 MMOL/L (ref 3.5–5.1)
SODIUM SERPL-SCNC: 134 MMOL/L (ref 136–145)

## 2020-12-03 ENCOUNTER — HISTORICAL (OUTPATIENT)
Dept: ADMINISTRATIVE | Facility: HOSPITAL | Age: 85
End: 2020-12-03

## 2020-12-03 LAB
BUN SERPL-MCNC: 28.7 MG/DL (ref 8.4–25.7)
CALCIUM SERPL-MCNC: 8.7 MG/DL (ref 8.8–10)
CHLORIDE SERPL-SCNC: 98 MMOL/L (ref 98–107)
CO2 SERPL-SCNC: 24 MMOL/L (ref 23–31)
CREAT SERPL-MCNC: 1.22 MG/DL (ref 0.72–1.25)
CREAT/UREA NIT SERPL: 24
GLUCOSE SERPL-MCNC: 86 MG/DL (ref 82–115)
POTASSIUM SERPL-SCNC: 5 MMOL/L (ref 3.5–5.1)
SODIUM SERPL-SCNC: 134 MMOL/L (ref 136–145)

## 2020-12-04 ENCOUNTER — HISTORICAL (OUTPATIENT)
Dept: ADMINISTRATIVE | Facility: HOSPITAL | Age: 85
End: 2020-12-04

## 2020-12-04 LAB
BUN SERPL-MCNC: 25.9 MG/DL (ref 8.4–25.7)
CALCIUM SERPL-MCNC: 8.9 MG/DL (ref 8.8–10)
CHLORIDE SERPL-SCNC: 99 MMOL/L (ref 98–107)
CO2 SERPL-SCNC: 26 MMOL/L (ref 23–31)
CREAT SERPL-MCNC: 1.05 MG/DL (ref 0.72–1.25)
CREAT/UREA NIT SERPL: 25
GLUCOSE SERPL-MCNC: 87 MG/DL (ref 82–115)
POTASSIUM SERPL-SCNC: 5.1 MMOL/L (ref 3.5–5.1)
SODIUM SERPL-SCNC: 137 MMOL/L (ref 136–145)

## 2020-12-08 ENCOUNTER — HISTORICAL (OUTPATIENT)
Dept: ADMINISTRATIVE | Facility: HOSPITAL | Age: 85
End: 2020-12-08

## 2020-12-08 LAB
BUN SERPL-MCNC: 31.2 MG/DL (ref 8.4–25.7)
CALCIUM SERPL-MCNC: 9.1 MG/DL (ref 8.8–10)
CHLORIDE SERPL-SCNC: 99 MMOL/L (ref 98–107)
CO2 SERPL-SCNC: 28 MMOL/L (ref 23–31)
CREAT SERPL-MCNC: 1.06 MG/DL (ref 0.72–1.25)
CREAT/UREA NIT SERPL: 29
GLUCOSE SERPL-MCNC: 82 MG/DL (ref 82–115)
POTASSIUM SERPL-SCNC: 5.7 MMOL/L (ref 3.5–5.1)
SODIUM SERPL-SCNC: 138 MMOL/L (ref 136–145)

## 2020-12-09 ENCOUNTER — HISTORICAL (OUTPATIENT)
Dept: ADMINISTRATIVE | Facility: HOSPITAL | Age: 85
End: 2020-12-09

## 2020-12-09 LAB
BUN SERPL-MCNC: 25.8 MG/DL (ref 8.4–25.7)
CALCIUM SERPL-MCNC: 9 MG/DL (ref 8.8–10)
CHLORIDE SERPL-SCNC: 98 MMOL/L (ref 98–107)
CO2 SERPL-SCNC: 29 MMOL/L (ref 23–31)
CREAT SERPL-MCNC: 1.18 MG/DL (ref 0.72–1.25)
CREAT/UREA NIT SERPL: 22
GLUCOSE SERPL-MCNC: 91 MG/DL (ref 82–115)
POTASSIUM SERPL-SCNC: 5 MMOL/L (ref 3.5–5.1)
SODIUM SERPL-SCNC: 139 MMOL/L (ref 136–145)

## 2020-12-11 ENCOUNTER — HISTORICAL (OUTPATIENT)
Dept: ADMINISTRATIVE | Facility: HOSPITAL | Age: 85
End: 2020-12-11

## 2020-12-11 LAB
BUN SERPL-MCNC: 34.1 MG/DL (ref 8.4–25.7)
CALCIUM SERPL-MCNC: 8.5 MG/DL (ref 8.8–10)
CHLORIDE SERPL-SCNC: 99 MMOL/L (ref 98–107)
CO2 SERPL-SCNC: 28 MMOL/L (ref 23–31)
CREAT SERPL-MCNC: 1.56 MG/DL (ref 0.72–1.25)
CREAT/UREA NIT SERPL: 22
GLUCOSE SERPL-MCNC: 84 MG/DL (ref 82–115)
POTASSIUM SERPL-SCNC: 4.5 MMOL/L (ref 3.5–5.1)
SODIUM SERPL-SCNC: 137 MMOL/L (ref 136–145)

## 2020-12-14 ENCOUNTER — HISTORICAL (OUTPATIENT)
Dept: ADMINISTRATIVE | Facility: HOSPITAL | Age: 85
End: 2020-12-14

## 2020-12-14 LAB
BUN SERPL-MCNC: 33.3 MG/DL (ref 8.4–25.7)
CALCIUM SERPL-MCNC: 8.6 MG/DL (ref 8.8–10)
CHLORIDE SERPL-SCNC: 101 MMOL/L (ref 98–107)
CO2 SERPL-SCNC: 27 MMOL/L (ref 23–31)
CREAT SERPL-MCNC: 1.13 MG/DL (ref 0.72–1.25)
CREAT/UREA NIT SERPL: 29
GLUCOSE SERPL-MCNC: 86 MG/DL (ref 82–115)
POTASSIUM SERPL-SCNC: 4.7 MMOL/L (ref 3.5–5.1)
SODIUM SERPL-SCNC: 137 MMOL/L (ref 136–145)

## 2020-12-21 ENCOUNTER — HISTORICAL (OUTPATIENT)
Dept: ADMINISTRATIVE | Facility: HOSPITAL | Age: 85
End: 2020-12-21

## 2020-12-21 LAB
BUN SERPL-MCNC: 30.2 MG/DL (ref 8.4–25.7)
CALCIUM SERPL-MCNC: 9 MG/DL (ref 8.8–10)
CHLORIDE SERPL-SCNC: 101 MMOL/L (ref 98–107)
CO2 SERPL-SCNC: 24 MMOL/L (ref 23–31)
CREAT SERPL-MCNC: 1.26 MG/DL (ref 0.72–1.25)
CREAT/UREA NIT SERPL: 24
GLUCOSE SERPL-MCNC: 90 MG/DL (ref 82–115)
POTASSIUM SERPL-SCNC: 5.4 MMOL/L (ref 3.5–5.1)
SODIUM SERPL-SCNC: 135 MMOL/L (ref 136–145)

## 2020-12-22 ENCOUNTER — HISTORICAL (OUTPATIENT)
Dept: ADMINISTRATIVE | Facility: HOSPITAL | Age: 85
End: 2020-12-22

## 2020-12-22 LAB
BUN SERPL-MCNC: 32.4 MG/DL (ref 8.4–25.7)
CALCIUM SERPL-MCNC: 9 MG/DL (ref 8.8–10)
CHLORIDE SERPL-SCNC: 103 MMOL/L (ref 98–107)
CO2 SERPL-SCNC: 24 MMOL/L (ref 23–31)
CREAT SERPL-MCNC: 1.27 MG/DL (ref 0.72–1.25)
CREAT/UREA NIT SERPL: 26
GLUCOSE SERPL-MCNC: 87 MG/DL (ref 82–115)
POTASSIUM SERPL-SCNC: 4.3 MMOL/L (ref 3.5–5.1)
SODIUM SERPL-SCNC: 139 MMOL/L (ref 136–145)

## 2020-12-28 ENCOUNTER — HISTORICAL (OUTPATIENT)
Dept: ADMINISTRATIVE | Facility: HOSPITAL | Age: 85
End: 2020-12-28

## 2020-12-28 LAB
BUN SERPL-MCNC: 33.2 MG/DL (ref 8.4–25.7)
CALCIUM SERPL-MCNC: 9.2 MG/DL (ref 8.8–10)
CHLORIDE SERPL-SCNC: 98 MMOL/L (ref 98–107)
CO2 SERPL-SCNC: 26 MMOL/L (ref 23–31)
CREAT SERPL-MCNC: 1.24 MG/DL (ref 0.72–1.25)
CREAT/UREA NIT SERPL: 27
GLUCOSE SERPL-MCNC: 91 MG/DL (ref 82–115)
POTASSIUM SERPL-SCNC: 5.1 MMOL/L (ref 3.5–5.1)
SODIUM SERPL-SCNC: 134 MMOL/L (ref 136–145)

## 2021-01-04 ENCOUNTER — HISTORICAL (OUTPATIENT)
Dept: ADMINISTRATIVE | Facility: HOSPITAL | Age: 86
End: 2021-01-04

## 2021-01-04 LAB
BUN SERPL-MCNC: 23.4 MG/DL (ref 8.4–25.7)
CALCIUM SERPL-MCNC: 9 MG/DL (ref 8.8–10)
CHLORIDE SERPL-SCNC: 97 MMOL/L (ref 98–107)
CO2 SERPL-SCNC: 28 MMOL/L (ref 23–31)
CREAT SERPL-MCNC: 1.13 MG/DL (ref 0.72–1.25)
CREAT/UREA NIT SERPL: 21
GLUCOSE SERPL-MCNC: 88 MG/DL (ref 82–115)
POTASSIUM SERPL-SCNC: 5.3 MMOL/L (ref 3.5–5.1)
SODIUM SERPL-SCNC: 132 MMOL/L (ref 136–145)

## 2021-01-05 ENCOUNTER — HISTORICAL (OUTPATIENT)
Dept: ADMINISTRATIVE | Facility: HOSPITAL | Age: 86
End: 2021-01-05

## 2021-01-05 LAB
BUN SERPL-MCNC: 27.2 MG/DL (ref 8.4–25.7)
CALCIUM SERPL-MCNC: 8.7 MG/DL (ref 8.8–10)
CHLORIDE SERPL-SCNC: 100 MMOL/L (ref 98–107)
CO2 SERPL-SCNC: 27 MMOL/L (ref 23–31)
CREAT SERPL-MCNC: 1.22 MG/DL (ref 0.72–1.25)
CREAT/UREA NIT SERPL: 22
GLUCOSE SERPL-MCNC: 118 MG/DL (ref 82–115)
POTASSIUM SERPL-SCNC: 4.5 MMOL/L (ref 3.5–5.1)
SODIUM SERPL-SCNC: 134 MMOL/L (ref 136–145)

## 2021-01-13 ENCOUNTER — HISTORICAL (OUTPATIENT)
Dept: ADMINISTRATIVE | Facility: HOSPITAL | Age: 86
End: 2021-01-13

## 2021-01-13 LAB
ALBUMIN SERPL-MCNC: 3.8 GM/DL (ref 3.4–4.8)
ALBUMIN/GLOB SERPL: 1.5 RATIO (ref 1.1–2)
ALP SERPL-CCNC: 95 UNIT/L (ref 40–150)
ALT SERPL-CCNC: 12 UNIT/L (ref 0–55)
AST SERPL-CCNC: 19 UNIT/L (ref 5–34)
BILIRUB SERPL-MCNC: 0.3 MG/DL (ref 0.2–1.2)
BILIRUBIN DIRECT+TOT PNL SERPL-MCNC: 0.1 MG/DL (ref 0–0.8)
BILIRUBIN DIRECT+TOT PNL SERPL-MCNC: 0.2 MG/DL (ref 0–0.5)
BUN SERPL-MCNC: 32.4 MG/DL (ref 8.4–25.7)
CALCIUM SERPL-MCNC: 8.7 MG/DL (ref 8.8–10)
CHLORIDE SERPL-SCNC: 93 MMOL/L (ref 98–107)
CO2 SERPL-SCNC: 28 MMOL/L (ref 23–31)
CREAT SERPL-MCNC: 1.31 MG/DL (ref 0.72–1.25)
GLOBULIN SER-MCNC: 2.6 GM/DL (ref 2.4–3.5)
GLUCOSE SERPL-MCNC: 82 MG/DL (ref 82–115)
POTASSIUM SERPL-SCNC: 5.4 MMOL/L (ref 3.5–5.1)
PROT SERPL-MCNC: 6.4 GM/DL (ref 5.8–7.6)
SODIUM SERPL-SCNC: 131 MMOL/L (ref 136–145)

## 2021-01-14 ENCOUNTER — HISTORICAL (OUTPATIENT)
Dept: ADMINISTRATIVE | Facility: HOSPITAL | Age: 86
End: 2021-01-14

## 2021-01-14 LAB
BUN SERPL-MCNC: 30.4 MG/DL (ref 8.4–25.7)
CALCIUM SERPL-MCNC: 8.4 MG/DL (ref 8.8–10)
CHLORIDE SERPL-SCNC: 98 MMOL/L (ref 98–107)
CO2 SERPL-SCNC: 24 MMOL/L (ref 23–31)
CREAT SERPL-MCNC: 1.12 MG/DL (ref 0.72–1.25)
CREAT/UREA NIT SERPL: 27
GLUCOSE SERPL-MCNC: 85 MG/DL (ref 82–115)
POTASSIUM SERPL-SCNC: 5.8 MMOL/L (ref 3.5–5.1)
SODIUM SERPL-SCNC: 133 MMOL/L (ref 136–145)

## 2021-01-15 ENCOUNTER — HISTORICAL (OUTPATIENT)
Dept: ADMINISTRATIVE | Facility: HOSPITAL | Age: 86
End: 2021-01-15

## 2021-01-15 LAB
BUN SERPL-MCNC: 28.9 MG/DL (ref 8.4–25.7)
CALCIUM SERPL-MCNC: 9 MG/DL (ref 8.8–10)
CHLORIDE SERPL-SCNC: 96 MMOL/L (ref 98–107)
CO2 SERPL-SCNC: 31 MMOL/L (ref 23–31)
CREAT SERPL-MCNC: 1.09 MG/DL (ref 0.72–1.25)
CREAT/UREA NIT SERPL: 27
GLUCOSE SERPL-MCNC: 89 MG/DL (ref 82–115)
POTASSIUM SERPL-SCNC: 5.7 MMOL/L (ref 3.5–5.1)
SODIUM SERPL-SCNC: 136 MMOL/L (ref 136–145)

## 2021-01-16 ENCOUNTER — HISTORICAL (OUTPATIENT)
Dept: ADMINISTRATIVE | Facility: HOSPITAL | Age: 86
End: 2021-01-16

## 2021-01-16 LAB
BUN SERPL-MCNC: 25.3 MG/DL (ref 8.4–25.7)
CALCIUM SERPL-MCNC: 9.2 MG/DL (ref 8.8–10)
CHLORIDE SERPL-SCNC: 96 MMOL/L (ref 98–107)
CO2 SERPL-SCNC: 31 MMOL/L (ref 23–31)
CREAT SERPL-MCNC: 1.2 MG/DL (ref 0.72–1.25)
CREAT/UREA NIT SERPL: 21
GLUCOSE SERPL-MCNC: 98 MG/DL (ref 82–115)
POTASSIUM SERPL-SCNC: 4.4 MMOL/L (ref 3.5–5.1)
SODIUM SERPL-SCNC: 137 MMOL/L (ref 136–145)

## 2021-01-26 ENCOUNTER — HISTORICAL (OUTPATIENT)
Dept: ADMINISTRATIVE | Facility: HOSPITAL | Age: 86
End: 2021-01-26

## 2021-01-26 LAB
BUN SERPL-MCNC: 18.4 MG/DL (ref 8.4–25.7)
CALCIUM SERPL-MCNC: 8.6 MG/DL (ref 8.8–10)
CHLORIDE SERPL-SCNC: 103 MMOL/L (ref 98–107)
CO2 SERPL-SCNC: 26 MMOL/L (ref 23–31)
CREAT SERPL-MCNC: 0.91 MG/DL (ref 0.72–1.25)
CREAT/UREA NIT SERPL: 20
GLUCOSE SERPL-MCNC: 79 MG/DL (ref 82–115)
POTASSIUM SERPL-SCNC: 4.5 MMOL/L (ref 3.5–5.1)
SODIUM SERPL-SCNC: 139 MMOL/L (ref 136–145)

## 2021-02-10 ENCOUNTER — HISTORICAL (OUTPATIENT)
Dept: ADMINISTRATIVE | Facility: HOSPITAL | Age: 86
End: 2021-02-10

## 2021-02-10 LAB
ALBUMIN SERPL-MCNC: 3.7 GM/DL (ref 3.4–4.8)
ALBUMIN/GLOB SERPL: 1.6 RATIO (ref 1.1–2)
ALP SERPL-CCNC: 99 UNIT/L (ref 40–150)
ALT SERPL-CCNC: 8 UNIT/L (ref 0–55)
AST SERPL-CCNC: 13 UNIT/L (ref 5–34)
BILIRUB SERPL-MCNC: 0.4 MG/DL (ref 0.2–1.2)
BILIRUBIN DIRECT+TOT PNL SERPL-MCNC: 0.2 MG/DL (ref 0–0.5)
BILIRUBIN DIRECT+TOT PNL SERPL-MCNC: 0.2 MG/DL (ref 0–0.8)
BUN SERPL-MCNC: 38.4 MG/DL (ref 8.4–25.7)
CALCIUM SERPL-MCNC: 8.7 MG/DL (ref 8.8–10)
CHLORIDE SERPL-SCNC: 102 MMOL/L (ref 98–107)
CO2 SERPL-SCNC: 26 MMOL/L (ref 23–31)
CREAT SERPL-MCNC: 1.4 MG/DL (ref 0.72–1.25)
GLOBULIN SER-MCNC: 2.3 GM/DL (ref 2.4–3.5)
GLUCOSE SERPL-MCNC: 93 MG/DL (ref 82–115)
POTASSIUM SERPL-SCNC: 5.3 MMOL/L (ref 3.5–5.1)
PROT SERPL-MCNC: 6 GM/DL (ref 5.8–7.6)
SODIUM SERPL-SCNC: 139 MMOL/L (ref 136–145)

## 2021-03-09 ENCOUNTER — HISTORICAL (OUTPATIENT)
Dept: ADMINISTRATIVE | Facility: HOSPITAL | Age: 86
End: 2021-03-09

## 2021-03-09 LAB
BUN SERPL-MCNC: 24.1 MG/DL (ref 8.4–25.7)
CALCIUM SERPL-MCNC: 9 MG/DL (ref 8.8–10)
CHLORIDE SERPL-SCNC: 98 MMOL/L (ref 98–107)
CO2 SERPL-SCNC: 28 MMOL/L (ref 23–31)
CREAT SERPL-MCNC: 1.11 MG/DL (ref 0.72–1.25)
CREAT/UREA NIT SERPL: 22
GLUCOSE SERPL-MCNC: 130 MG/DL (ref 82–115)
POTASSIUM SERPL-SCNC: 5.6 MMOL/L (ref 3.5–5.1)
SODIUM SERPL-SCNC: 138 MMOL/L (ref 136–145)

## 2021-06-12 ENCOUNTER — HISTORICAL (OUTPATIENT)
Dept: ADMINISTRATIVE | Facility: HOSPITAL | Age: 86
End: 2021-06-12

## 2021-06-12 LAB
ALBUMIN SERPL-MCNC: 3 GM/DL (ref 3.4–4.8)
ALBUMIN/GLOB SERPL: 1 RATIO (ref 1.1–2)
ALP SERPL-CCNC: 111 UNIT/L (ref 40–150)
ALT SERPL-CCNC: 13 UNIT/L (ref 0–55)
AST SERPL-CCNC: 25 UNIT/L (ref 5–34)
BILIRUB SERPL-MCNC: 0.8 MG/DL
BILIRUBIN DIRECT+TOT PNL SERPL-MCNC: 0.4 MG/DL (ref 0–0.5)
BILIRUBIN DIRECT+TOT PNL SERPL-MCNC: 0.4 MG/DL (ref 0–0.8)
BUN SERPL-MCNC: 26.9 MG/DL (ref 8.4–25.7)
CALCIUM SERPL-MCNC: 8.9 MG/DL (ref 8.8–10)
CHLORIDE SERPL-SCNC: 100 MMOL/L (ref 98–107)
CO2 SERPL-SCNC: 28 MMOL/L (ref 23–31)
CREAT SERPL-MCNC: 1.06 MG/DL (ref 0.73–1.18)
GLOBULIN SER-MCNC: 3 GM/DL (ref 2.4–3.5)
GLUCOSE SERPL-MCNC: 91 MG/DL (ref 82–115)
POTASSIUM SERPL-SCNC: 6.3 MMOL/L (ref 3.5–5.1)
PROT SERPL-MCNC: 6 GM/DL (ref 5.8–7.6)
SODIUM SERPL-SCNC: 137 MMOL/L (ref 136–145)

## 2021-06-13 ENCOUNTER — HISTORICAL (OUTPATIENT)
Dept: ADMINISTRATIVE | Facility: HOSPITAL | Age: 86
End: 2021-06-13

## 2021-06-13 LAB
ALBUMIN SERPL-MCNC: 2.7 GM/DL (ref 3.4–4.8)
ALBUMIN/GLOB SERPL: 1 RATIO (ref 1.1–2)
ALP SERPL-CCNC: 98 UNIT/L (ref 40–150)
ALT SERPL-CCNC: 13 UNIT/L (ref 0–55)
AST SERPL-CCNC: 24 UNIT/L (ref 5–34)
BILIRUB SERPL-MCNC: 0.8 MG/DL
BILIRUBIN DIRECT+TOT PNL SERPL-MCNC: 0.4 MG/DL (ref 0–0.5)
BILIRUBIN DIRECT+TOT PNL SERPL-MCNC: 0.4 MG/DL (ref 0–0.8)
BUN SERPL-MCNC: 24.3 MG/DL (ref 8.4–25.7)
CALCIUM SERPL-MCNC: 8.7 MG/DL (ref 8.8–10)
CHLORIDE SERPL-SCNC: 104 MMOL/L (ref 98–107)
CO2 SERPL-SCNC: 28 MMOL/L (ref 23–31)
CREAT SERPL-MCNC: 0.9 MG/DL (ref 0.73–1.18)
GLOBULIN SER-MCNC: 2.7 GM/DL (ref 2.4–3.5)
GLUCOSE SERPL-MCNC: 98 MG/DL (ref 82–115)
POTASSIUM SERPL-SCNC: 5.7 MMOL/L (ref 3.5–5.1)
PROT SERPL-MCNC: 5.4 GM/DL (ref 5.8–7.6)
SODIUM SERPL-SCNC: 142 MMOL/L (ref 136–145)

## 2021-06-15 ENCOUNTER — HISTORICAL (OUTPATIENT)
Dept: ADMINISTRATIVE | Facility: HOSPITAL | Age: 86
End: 2021-06-15

## 2021-06-15 LAB — SARS-COV-2 RNA RESP QL NAA+PROBE: NOT DETECTED

## 2021-09-01 ENCOUNTER — HISTORICAL (OUTPATIENT)
Dept: ADMINISTRATIVE | Facility: HOSPITAL | Age: 86
End: 2021-09-01

## 2022-01-28 ENCOUNTER — HISTORICAL (OUTPATIENT)
Dept: ADMINISTRATIVE | Facility: HOSPITAL | Age: 87
End: 2022-01-28

## 2022-02-16 ENCOUNTER — HISTORICAL (OUTPATIENT)
Dept: ADMINISTRATIVE | Facility: HOSPITAL | Age: 87
End: 2022-02-16

## 2022-02-16 LAB
ABS NEUT (OLG): 7.62 (ref 2.1–9.2)
ALBUMIN SERPL-MCNC: 4 G/DL (ref 3.4–4.8)
ALBUMIN/GLOB SERPL: 1.1 {RATIO} (ref 1.1–2)
ALP SERPL-CCNC: 141 U/L (ref 40–150)
ALT SERPL-CCNC: 13 U/L (ref 0–55)
AST SERPL-CCNC: 21 U/L (ref 5–34)
BASOPHILS # BLD AUTO: 0.2 10*3/UL (ref 0–0.2)
BASOPHILS NFR BLD AUTO: 1.7 %
BILIRUB SERPL-MCNC: 0.5 MG/DL
BILIRUBIN DIRECT+TOT PNL SERPL-MCNC: 0.2 (ref 0–0.5)
BILIRUBIN DIRECT+TOT PNL SERPL-MCNC: 0.3 (ref 0–0.8)
BUN SERPL-MCNC: 42.8 MG/DL (ref 8.4–25.7)
CALCIUM SERPL-MCNC: 9.8 MG/DL (ref 8.7–10.5)
CHLORIDE SERPL-SCNC: 99 MMOL/L (ref 98–107)
CO2 SERPL-SCNC: 26 MMOL/L (ref 23–31)
CREAT SERPL-MCNC: 1.45 MG/DL (ref 0.73–1.18)
EOSINOPHIL # BLD AUTO: 0.7 10*3/UL (ref 0–0.9)
EOSINOPHIL NFR BLD AUTO: 6.2 %
ERYTHROCYTE [DISTWIDTH] IN BLOOD BY AUTOMATED COUNT: 32 % (ref 11.5–17)
FERRITIN SERPL-MCNC: 159.44 NG/ML (ref 21.81–274.66)
GLOBULIN SER-MCNC: 3.5 G/DL (ref 2.4–3.5)
GLUCOSE SERPL-MCNC: 91 MG/DL (ref 82–115)
HCT VFR BLD AUTO: 48.4 % (ref 42–52)
HEMOLYSIS INTERF INDEX SERPL-ACNC: 20
HGB BLD-MCNC: 14.5 G/DL (ref 14–18)
ICTERIC INTERF INDEX SERPL-ACNC: 0
IRON SATN MFR SERPL: 22 % (ref 20–50)
IRON SERPL-MCNC: 67 UG/DL (ref 65–175)
LIPEMIC INTERF INDEX SERPL-ACNC: 8
LYMPHOCYTES # BLD AUTO: 1.4 10*3/UL (ref 0.6–4.6)
LYMPHOCYTES NFR BLD AUTO: 13.7 %
MANUAL DIFF? (OHS): NO
MCH RBC QN AUTO: 23.1 PG (ref 27–31)
MCHC RBC AUTO-ENTMCNC: 30 G/DL (ref 33–36)
MCV RBC AUTO: 77.1 FL (ref 80–94)
MONOCYTES # BLD AUTO: 0.6 10*3/UL (ref 0.1–1.3)
MONOCYTES NFR BLD AUTO: 5.9 %
NEUTROPHILS # BLD AUTO: 7.6 10*3/UL (ref 2.1–9.2)
NEUTROPHILS NFR BLD AUTO: 72.1 %
PLATELET # BLD AUTO: 975 10*3/UL (ref 130–400)
PMV BLD AUTO: 9.5 FL (ref 9.4–12.4)
POTASSIUM SERPL-SCNC: 6.1 MMOL/L (ref 3.5–5.1)
PROT SERPL-MCNC: 7.5 G/DL (ref 5.8–7.6)
RBC # BLD AUTO: 6.28 10*6/UL (ref 4.7–6.1)
SODIUM SERPL-SCNC: 135 MMOL/L (ref 136–145)
TIBC SERPL-MCNC: 231 UG/DL (ref 69–240)
TIBC SERPL-MCNC: 298 UG/DL (ref 250–450)
TRANSFERRIN SERPL-MCNC: 271 MG/DL
WBC # SPEC AUTO: 10.6 10*3/UL (ref 4.5–11.5)

## 2022-02-18 ENCOUNTER — HISTORICAL (OUTPATIENT)
Dept: ADMINISTRATIVE | Facility: HOSPITAL | Age: 87
End: 2022-02-18

## 2022-02-18 LAB
HEMOLYSIS INTERF INDEX SERPL-ACNC: 12
POTASSIUM SERPL-SCNC: 6.2 MMOL/L (ref 3.5–5.1)

## 2022-02-21 ENCOUNTER — HISTORICAL (OUTPATIENT)
Dept: ADMINISTRATIVE | Facility: HOSPITAL | Age: 87
End: 2022-02-21

## 2022-03-31 ENCOUNTER — HISTORICAL (OUTPATIENT)
Dept: ADMINISTRATIVE | Facility: HOSPITAL | Age: 87
End: 2022-03-31

## 2022-03-31 LAB
ABS NEUT (OLG): 7.59 (ref 2.1–9.2)
ALBUMIN SERPL-MCNC: 3.1 G/DL (ref 3.4–4.8)
ALBUMIN/GLOB SERPL: 0.9 {RATIO} (ref 1.1–2)
ALP SERPL-CCNC: 120 U/L (ref 40–150)
ALT SERPL-CCNC: 13 U/L (ref 0–55)
AST SERPL-CCNC: 19 U/L (ref 5–34)
BASOPHILS # BLD AUTO: 0.1 10*3/UL (ref 0–0.2)
BASOPHILS NFR BLD AUTO: 1 %
BILIRUB SERPL-MCNC: 0.8 MG/DL
BILIRUBIN DIRECT+TOT PNL SERPL-MCNC: 0.4 (ref 0–0.5)
BILIRUBIN DIRECT+TOT PNL SERPL-MCNC: 0.4 (ref 0–0.8)
BUN SERPL-MCNC: 48.5 MG/DL (ref 8.4–25.7)
CALCIUM SERPL-MCNC: 9.3 MG/DL (ref 8.7–10.5)
CHLORIDE SERPL-SCNC: 97 MMOL/L (ref 98–107)
CO2 SERPL-SCNC: 27 MMOL/L (ref 23–31)
CREAT SERPL-MCNC: 1.2 MG/DL (ref 0.73–1.18)
EOSINOPHIL # BLD AUTO: 0.4 10*3/UL (ref 0–0.9)
EOSINOPHIL NFR BLD AUTO: 4 %
ERYTHROCYTE [DISTWIDTH] IN BLOOD BY AUTOMATED COUNT: 25.5 % (ref 11.5–17)
GLOBULIN SER-MCNC: 3.6 G/DL (ref 2.4–3.5)
GLUCOSE SERPL-MCNC: 90 MG/DL (ref 82–115)
HCT VFR BLD AUTO: 46.6 % (ref 42–52)
HEMOLYSIS INTERF INDEX SERPL-ACNC: 13
HGB BLD-MCNC: 14.7 G/DL (ref 14–18)
ICTERIC INTERF INDEX SERPL-ACNC: 1
LIPEMIC INTERF INDEX SERPL-ACNC: 1
LYMPHOCYTES # BLD AUTO: 1.3 10*3/UL (ref 0.6–4.6)
LYMPHOCYTES NFR BLD AUTO: 12 %
MANUAL DIFF? (OHS): NO
MCH RBC QN AUTO: 27 PG (ref 27–31)
MCHC RBC AUTO-ENTMCNC: 31.5 G/DL (ref 33–36)
MCV RBC AUTO: 85.7 FL (ref 80–94)
MONOCYTES # BLD AUTO: 1.2 10*3/UL (ref 0.1–1.3)
MONOCYTES NFR BLD AUTO: 12 %
NEUTROPHILS # BLD AUTO: 7.59 10*3/UL (ref 2.1–9.2)
NEUTROPHILS NFR BLD AUTO: 71 %
PLATELET # BLD AUTO: 591 10*3/UL (ref 130–400)
PMV BLD AUTO: 10.7 FL (ref 9.4–12.4)
POS ERR1 (OHS): NORMAL
POTASSIUM SERPL-SCNC: 4.7 MMOL/L (ref 3.5–5.1)
PROT SERPL-MCNC: 6.7 G/DL (ref 5.8–7.6)
RBC # BLD AUTO: 5.44 10*6/UL (ref 4.7–6.1)
SODIUM SERPL-SCNC: 134 MMOL/L (ref 136–145)
WBC # SPEC AUTO: 10.7 10*3/UL (ref 4.5–11.5)

## 2022-04-07 ENCOUNTER — HISTORICAL (OUTPATIENT)
Dept: ADMINISTRATIVE | Facility: HOSPITAL | Age: 87
End: 2022-04-07
Payer: OTHER GOVERNMENT

## 2022-04-17 ENCOUNTER — HISTORICAL (OUTPATIENT)
Dept: ADMINISTRATIVE | Facility: HOSPITAL | Age: 87
End: 2022-04-17

## 2022-04-24 VITALS
SYSTOLIC BLOOD PRESSURE: 145 MMHG | DIASTOLIC BLOOD PRESSURE: 78 MMHG | HEIGHT: 70 IN | BODY MASS INDEX: 19.41 KG/M2 | WEIGHT: 135.56 LBS | OXYGEN SATURATION: 91 %

## 2022-04-27 RX ORDER — HYDROXYZINE HYDROCHLORIDE 25 MG/1
25 TABLET, FILM COATED ORAL 2 TIMES DAILY
COMMUNITY

## 2022-04-27 RX ORDER — TRAZODONE HYDROCHLORIDE 100 MG/1
100 TABLET ORAL NIGHTLY
COMMUNITY

## 2022-04-27 RX ORDER — LEVOTHYROXINE SODIUM 50 UG/1
50 TABLET ORAL
COMMUNITY

## 2022-04-27 RX ORDER — GABAPENTIN 300 MG/1
300 CAPSULE ORAL 2 TIMES DAILY
COMMUNITY

## 2022-04-27 RX ORDER — LEVETIRACETAM 500 MG/1
500 TABLET ORAL 2 TIMES DAILY
COMMUNITY

## 2022-04-27 RX ORDER — ALBUTEROL SULFATE 90 UG/1
180 AEROSOL, METERED RESPIRATORY (INHALATION) EVERY 6 HOURS PRN
COMMUNITY
Start: 2022-01-31

## 2022-04-27 RX ORDER — NITROGLYCERIN 0.4 MG/1
0.4 TABLET SUBLINGUAL
COMMUNITY
Start: 2022-01-31

## 2022-04-27 RX ORDER — RIVASTIGMINE 4.6 MG/24H
1 PATCH, EXTENDED RELEASE TRANSDERMAL DAILY
COMMUNITY

## 2022-04-27 RX ORDER — VIT C/E/ZN/COPPR/LUTEIN/ZEAXAN 250MG-90MG
1000 CAPSULE ORAL DAILY
COMMUNITY

## 2022-04-27 RX ORDER — SODIUM BICARBONATE 325 MG/1
325 TABLET ORAL
COMMUNITY

## 2022-04-27 RX ORDER — METOPROLOL SUCCINATE 25 MG/1
25 TABLET, EXTENDED RELEASE ORAL DAILY
COMMUNITY

## 2022-04-27 RX ORDER — DICLOFENAC SODIUM 10 MG/G
2 GEL TOPICAL
COMMUNITY

## 2022-04-27 RX ORDER — ASPIRIN 325 MG
325 TABLET ORAL DAILY
COMMUNITY

## 2022-04-27 RX ORDER — LISINOPRIL 2.5 MG/1
2.5 TABLET ORAL DAILY
COMMUNITY
End: 2022-05-02

## 2022-04-29 NOTE — ED PROVIDER NOTES
Patient:   Paulie Madrigal             MRN: [id number]            FIN: [id number]               Age:   84 years     Sex:  Male     :  1935   Associated Diagnoses:   Hydrocephalus in adult   Author:   Maggi LEO, Radha Salcido      Basic Information   Time seen: Date & time 7/10/2019 00:17:00.   History source: Patient.   Arrival mode: Ambulance.   History limitation: None.      History of Present Illness   The patient presents with         85 y/o  male with hx of COPD, dementia and CAD presents to the ED c/o multiple falls in the last 2 days. Pt states that he fell twice yesterday (19), once in his driveway and once in the restroom in which he hit his R side on the toilet, breaking his ribs. Pt reports experiencing dizziness, neck pain, back pain, R arm pain and R shoulder pain. Pt also c/o chest pain and SOB, however, he states that he has these symptoms often with his COPD. Pt states that he feels off balance when he walks and has recently started using a walker due to his frequent episodes of falling. Pt denies LOC, however, he does state that he hit his head during one of his falls.  He lives at home with his wife.  The onset was 2  days ago.  The course/duration of symptoms is fluctuating in intensity.  Location: right, lower chest.  Type of injury: fall.  The character of symptoms is pain.  The location where the incident occurred was at home.  The degree of pain is moderate.  The degree of bleeding is none.  The exacerbating factor is Coughing.  The relieving factor is none.  Risk factors consist of age and not anticoagulated.  Therapy today: emergency medical services.  Associated symptoms: shortness of breath, chest pain, back pain, dizziness and denies loss of consciousness.  Additional history: none.        Review of Systems   Constitutional symptoms:  No fever,    Skin symptoms:  No rash,    Eye symptoms:  No recent vision problems,    ENMT symptoms:  No sore throat,     Respiratory symptoms:  Shortness of breath, cough.    Cardiovascular symptoms:  Chest pain, No syncope,    Gastrointestinal symptoms:  No abdominal pain, no nausea, no vomiting, no diarrhea, no constipation.    Genitourinary symptoms:  No dysuria, no hematuria.    Musculoskeletal symptoms:  Back pain, Neck pain, r arm pain, R shoulder pain, R rib/trunk pain.    Neurologic symptoms:  Dizziness, no headache, no altered level of consciousness.              Additional review of systems information: Limited secondary to dementia.      Health Status   Allergies:    Allergic Reactions (Selected)  Severity Not Documented  Sulfa drugs- Hives..   Medications:  (Selected)   Prescriptions  Prescribed  ProAir HFA 90 mcg/inh inhalation aerosol with adapter: 1 puff(s), INH, q6hr, PRN PRN for wheezing, # 1 EA, 0 Refill(s), Pharmacy: [facility]  atorvastatin 40 mg oral tablet: 0.5 tab, Oral, Daily, # 4 tab(s), 0 Refill(s)  Documented Medications  Documented  ALPRAZOLAM   TAB 0.25MG:   ALPRAZOLAM   TAB 1MG:   Pantoprazole 40 mg ORAL EC-Tablet: 40 mg = 1 tab(s), Oral, Daily, # 90 tab(s), 3 Refill(s)  Symbicort 80 mcg-4.5 mcg/inh inhalation aerosol: 1 puff, INH, BID, 0 Refill(s)  amitriptyline 50 mg oral tablet: 100 mg = 2 tab(s), Oral, Once a day (at bedtime), # 180 tab(s), 0 Refill(s)  aspirin 81 mg oral Delayed Release (EC) tablet: 81 mg = 1 tab(s), Oral, Daily, # 30 tab(s), 0 Refill(s)  carvedilol 12.5 mg oral tablet: 6.25 mg = 0.5 tab(s), Oral, BID, # 90 tab(s), 0 Refill(s)  docusate calcium 240 mg oral capsule: 240 mg = 1 cap(s), Oral, Daily, PRN PRN for constipation, 0 Refill(s)  ferrous gluconate 324 mg oral tablet: = 1 tab(s), Oral, TID, # 100 tab(s), 0 Refill(s)  hydrOXYzine pamoate 25 mg oral capsule: 25 mg = 1 cap(s), Oral, At Bedtime, PRN PRN for sleep, 0 Refill(s)  ketoconazole 1% topical shampoo: See Instructions, use moderate amount to skin every day as directed on bottle, 0 Refill(s)  pregabalin 50 mg oral  capsule: 50 mg = 1 cap(s), Oral, BID, 0 Refill(s)  raNITIdine 150 mg oral capsule: 150 mg = 1 cap(s), Oral, BID, # 60 cap(s), 0 Refill(s)  risperiDONE 0.5 mg oral tablet, disintegratin.5 mg = 1 tab(s), Oral, BID, # 60 tab(s), 0 Refill(s)  sertraline 50 mg oral tablet: 50 mg = 1 tab(s), Oral, Daily  terazosin 2 mg oral capsule: 2 mg = 1 cap(s), Oral, Once a day (at bedtime), # 90 cap(s), 0 Refill(s).   Immunizations: Unknown.      Past Medical/ Family/ Social History   Medical history:    Active  Bilateral hearing loss ([id number])  CHF - Congestive heart failure (376121459)  COPD (09689272)  Falls ([id number])  High cholesterol ([id number])  Pneumococcal pneumonia (481040623)  Poor peripheral circulation (3666793)  Tremors of nervous system ([id number])  Resolved  Cataracts ([id number]):  Resolved on 2015 at 80 years.  Comments:  2015 CDT 14:46 CDT - SYSTEM  Problem automatically updated based on documentation on Capillary Refills, Brachial Pulses, Radial Pulses, Femoral Pulses, Dorsalis Pulses, Ulnar pulses, Popliteal Pulses, Postibial Pulses, Edemas, Affect/Behavior, Orientation Assessment, Arterial Line Site.  Chest pain ([id number]):  Resolved on 2/3/2016 at 80 years.  Comments:  2015 CDT 14:46 CDT - SYSTEM  Problem automatically updated based on documentation on Capillary Refills, Brachial Pulses, Radial Pulses, Femoral Pulses, Dorsalis Pulses, Ulnar pulses, Popliteal Pulses, Postibial Pulses, Edemas, Affect/Behavior, Orientation Assessment, Arterial Line Site.  DVT (deep venous thrombosis) ([id number]):  Resolved on 2015 at 80 years.  Comments:  2014 CST 0:57 CST - Evonne RN, Ebony M.  Right letg    2015 CDT 14:46 CDT - SYSTEM  Problem automatically updated based on documentation on Capillary Refills, Brachial Pulses, Radial Pulses, Femoral Pulses, Dorsalis Pulses, Ulnar pulses, Popliteal Pulses,  Postibial Pulses, Edemas, Affect/Behavior, Orientation Assessment, Arterial Line Site.  Pulmonary emboli ([id number]):  Resolved on 8/20/2015 at 80 years.  Comments:  8/20/2015 CDT 14:46 CDT - SYSTEM  Problem automatically updated based on documentation on Capillary Refills, Brachial Pulses, Radial Pulses, Femoral Pulses, Dorsalis Pulses, Ulnar pulses, Popliteal Pulses, Postibial Pulses, Edemas, Affect/Behavior, Orientation Assessment, Arterial Line Site..   Surgical history:    Cataract surgery ([id number]) in 2014 at 78 Years.  Comments:  12/23/2014 0:59 CST - Evonne RNEbony  both eyes  Lumbar discectomy ([id number]) in 2011 at 75 Years.  Arthroplasty of hip, total, with use of methyl methacrylate ([id number]) on 1/1/2010 at 74 Years.  Comments:  3/22/2012 4:22 CDT - Elke Evans RN  LEFT HIP  THUMB SURGERY.  CABG - Coronary artery bypass graft ([id number]).  ptca..   Family history: Not significant.   Social history: Alcohol use: past, Tobacco use: Quit 2 years ago, Drug use: Denies.   Problem list:    Active Problems (11)  ADL   Bilateral hearing loss   CHF - Congestive heart failure   COPD   Falls   High cholesterol   Pneumococcal pneumonia   Poor peripheral circulation   Pressure ulcer stage 1   Tobacco user   Tremors of nervous system   .      Physical Examination               Vital Signs   Vital Signs   7/10/2019 1:15 CDT       Peripheral Pulse Rate     85 bpm                             Respiratory Rate          20 br/min                             SpO2                      98 %                             Oxygen Therapy            Room air    7/10/2019 0:08 CDT       Peripheral Pulse Rate     91 bpm                             Respiratory Rate          16 br/min                             SpO2                      97 %                             Oxygen Therapy            Room air                             Systolic Blood Pressure   127 mmHg                              Diastolic Blood Pressure  65 mmHg                             Mean Arterial Pressure, Cuff              86 mmHg    7/9/2019 23:07 CDT       Temperature Oral          37 DegC                             Temperature Oral (calculated)             98.60 DegF                             Peripheral Pulse Rate     91 bpm                             Respiratory Rate          16 br/min                             SpO2                      97 %                             Oxygen Therapy            Room air                             Systolic Blood Pressure   126 mmHg                             Diastolic Blood Pressure  62 mmHg  .   Measurements   7/9/2019 23:07 CDT       Weight Estimated          59 kg  .   Basic Oxygen Information   7/10/2019 1:15 CDT       SpO2                      98 %                             Oxygen Therapy            Room air    7/10/2019 0:08 CDT       SpO2                      97 %                             Oxygen Therapy            Room air    7/9/2019 23:07 CDT       SpO2                      97 %                             Oxygen Therapy            Room air  .   General:  Alert, no acute distress.    Skin:  Warm, dry, intact, no pallor, no rash, normal for ethnicity, No obvious trauma noted to the face, neck, trunk, or extremities.    Head:  Normocephalic, atraumatic.    Neck:  Supple, trachea midline, Tenderness to palpation over the neck bilaterally without midline step-off or deformity.    Eye:  Pupils are equal, round and reactive to light, extraocular movements are intact, normal conjunctiva.    Ears, nose, mouth and throat:  Oral mucosa moist.   Cardiovascular:  Regular rate and rhythm, Normal peripheral perfusion, No edema.    Respiratory:  Lungs are clear to auscultation, respirations are non-labored, breath sounds are equal, Symmetrical chest wall expansion.    Chest wall:  No deformity, guarding R chest when coughing, R chest wall tenderness.    Back:  Normal range of motion,  Normal alignment, no step-offs, C-spine tenderness; no step-offs or deformity.    Musculoskeletal:  All other adjacent joints normal, 4/5 strength to RUE, Proximal upper extremity: Right, shoulder, tenderness.    Gastrointestinal:  Soft, Nontender, Non distended, Normal bowel sounds.    Neurological:  CN II-XII intact, normal sensory observed, Oriented to person, place, situation, month, not year..    Psychiatric:  Cooperative.      Medical Decision Making   Differential Diagnosis:  Chest wall pain, rib fracture, chest wall injury.    Rationale:  84-year-old male with history of dementia, CAD, COPD presents for evaluation of recurrent falls over the past 2 days.  He reports to me he fell 4 times the past 4 days and is now using a walker because he feels unsteady on his feet.  He is reporting lightheadedness, dizziness described as the room is spinning and generalized weakness.  He denies any vomiting or diarrhea.  He has been tolerating PO but states his appetite is poor.  History somewhat limited with patient's history of dementia.  No family at the bedside.  He is afebrile and hemodynamic stable.  Does report hitting his head.  No loss of consciousness.  Considering disorientation, CT of the head, cervical spine ordered along with basic labs and chest x-ray.  CT head significant for ventricles appearing dilated out of proportion to the sulci suggesting an element of nonobjective hydrocephalus.  CT cervical spine and labs negative for acute abnormalities.  Chest x-ray without any obvious pneumothorax or opacities.  Considering recurrent falls with evidence of nonobstructive hydrocephalus, I have consult with his primary care provider, Dr. Amaya, for admission for further evaluation and evaluation for physical therapy..   Documents reviewed:  Emergency department nurses' notes.   Electrocardiogram:  Time 7/9/2019 23:07:00, rate 84, normal sinus rhythm, No ST-T changes, EP Interp, No ST elevation or acute  change from previous tracings.    Results review:  Lab results : [facility]   7/10/2019 0:31 CDT       PT                        14.7 second(s)  HI                             INR                       1.1                             PTT                       30.0 second(s)    7/9/2019 23:33 CDT       Sodium Lvl                138 mmol/L                             Potassium Lvl             4.7 mmol/L                             Chloride                  104 mmol/L                             CO2                       27.0 mmol/L                             Calcium Lvl               8.6 mg/dL                             Glucose Lvl               107 mg/dL  HI                             BUN                       23.0 mg/dL  HI                             Creatinine                1.45 mg/dL  HI                             eGFR-AA                   60 mL/min/1.73 m2  NA                             eGFR-OSMAR                  49 mL/min/1.73 m2  NA                             Bili Total                0.3 mg/dL                             Bili Direct               0.20 mg/dL                             Bili Indirect             0.10 mg/dL                             AST                       20 unit/L                             ALT                       20 unit/L                             Alk Phos                  95 unit/L                             Total Protein             6.6 gm/dL                             Albumin Lvl               3.60 gm/dL                             Globulin                  3.00 gm/dL                             A/G Ratio                 1.2  NA                             WBC                       11.8 x10(3)/mcL  HI                             RBC                       4.04 x10(6)/mcL  LOW                             Hgb                       12.3 gm/dL  LOW                             Hct                       40.0 %  LOW                             Platelet                  328  x10(3)/mcL                             MCV                       99.0 fL  HI                             MCH                       30.4 pg                             MCHC                      30.8 gm/dL  LOW                             RDW                       14.2 %                             MPV                       10.7 fL                             Abs Neut                  8.58 x10(3)/mcL                             Neutro Auto               72 %  NA                             Lymph Auto                12 %  NA                             Mono Auto                 8 %  NA                             Eos Auto                  5 %  NA                             Abs Eos                   0.6 x10(3)/mcL                             Basophil Auto             1 %  NA                             Abs Neutro                8.58 x10(3)/mcL                             Abs Lymph                 1.4 x10(3)/mcL                             Abs Mono                  1.0 x10(3)/mcL                             Abs Baso                  0.1 x10(3)/mcL    .   Radiology results:  Reported at  7/10/2019 01:03:00, Computed tomography, Cervical Spine, without contrast, reviewed radiologist's report,       Technique:CT of the cervical spine was performed without intravenous contrast with direct axial as well as sagittal and coronal reconstruction images.     Comparison:No prior study available for comparison.     History:MULTIPLE FALLS,.     Findings:  Lung apices:There are moderate nonspecific chronic changes at the visualized lung apices.     Spine:  Mineralization:Moderate osteopenia is seen in the visualized bony structures.  Fractures:No acute fracture dislocation or subluxation is seen.  Vertebral Fusion:None.  Rotation:No significant cervical spine rotation is seen.  Scoliosis:No significant scoliosis is seen.  Listhesis:No listhesis identified.  Lordosis:Mild degenerative straightening of the cervical lordosis is  seen.  Intervertebral disc spaces:Multilevel loss of disc height is seen.  Degenerative changes:  Osteophytes:Multilevel anterior and posterior endplate osteophytes are seen.  Endplate Sclerosis:Multilevel endplate sclerosis is seen.  Uncovertebral degenerative changes:Multilevel uncovertebral joint arthrosis is seen.  Facet degenerative changes:Multilevel facet degenerative changes are seen.     Soft Tissues:Unremarkable.     Miscellaneous:There is atheromatous calcification of the carotid bulbs and proximal internal carotid artery.        Impression:  1. No acute fracture dislocation or subluxation is seen.  2. Degenerative changes as described. Details as above..       Reexamination/ Reevaluation   Time: 7/10/2019 02:45:00 .   Course: improving.   Pain status: decreased.   Assessment: exam improved.   Notes: Patient resting comfortably at the Tylenol.  Dr. Amaya contacted for admission.      Impression and Plan   Diagnosis   Hydrocephalus in adult (NIQ21-KT G91.9)   rib fracture        Calls-Consults   -  7/10/2019 02:48:00 , Monique LEO, Joshua.    Plan   Condition: Improved, Stable.    Disposition: Admit time  7/10/2019 02:50:00, Place in Observation Unit, Joshua Amaya MD.    Counseled: Patient, Family, Regarding diagnosis, Regarding diagnostic results, Regarding treatment plan, Patient understood.    Notes: I, Taylor Jeansonne, acted solely as a scribe for and in the presence of Dr. Tay who performed the service., I, Dr. Radha Tay, personally evaluated and examined this patient and agree with the documentation as above. .

## 2022-04-29 NOTE — ED PROVIDER NOTES
Patient:   Paulie Madrigal             MRN: [id number]            FIN: [id number]               Age:   83 years     Sex:  Male     :  1935   Associated Diagnoses:   Fever   Author:   Tomer Ferguson MD      Basic Information   Time seen: Date 2018.   History source: Patient.   Arrival mode: Private vehicle.   History limitation: None.   Additional information: Chief Complaint from Nursing Triage Note : Chief Complaint   2018 20:01 CDT       Chief Complaint           Abd pain that started after eating lunch today. pt states no BM in 4-5 days  .      History of Present Illness   The patient presents with abdominal pain.  The onset was 8  hours ago.  The course/duration of symptoms is constant.  The character of symptoms is dull.  The degree at onset was moderate.  The Location of pain at onset was left, lower and abdominal.  The Location of pain at present is left, lower and abdominal.  Radiating pain: none. The exacerbating factor is none.  The relieving factor is none.  Therapy today: none.  Risk factors consist of age.  Associated symptoms: nausea, vomiting and denies diarrhea.  Patient says he has been constipated.  His last bowel movement was 3 or 4 days.  Denies any blood or melena.  Patient says he vomits at least once daily and has been doing so for the past year.        Review of Systems   Constitutional symptoms:  Negative except as documented in HPI.   Skin symptoms:  Negative except as documented in HPI.   Eye symptoms:  Negative except as documented in HPI.   ENMT symptoms:  Negative except as documented in HPI.   Respiratory symptoms:  Negative except as documented in HPI.   Cardiovascular symptoms:  Negative except as documented in HPI.   Gastrointestinal symptoms:  Abdominal pain, constipation.    Genitourinary symptoms:  Negative except as documented in HPI.   Musculoskeletal symptoms:  Negative except as documented in HPI.   Neurologic symptoms:  Negative except as documented  in HPI.   Psychiatric symptoms:  Negative except as documented in HPI.   Endocrine symptoms:  Negative except as documented in HPI.   Hematologic/Lymphatic symptoms:  Negative except as documented in HPI.   Allergy/immunologic symptoms:  Negative except as documented in HPI.             Additional review of systems information: All other systems reviewed and otherwise negative.      Health Status   Allergies:    Allergic Reactions (Selected)  Severity Not Documented  Sulfa drugs- Hives.,    Allergies (1) Active Reaction  sulfa drugs hives  .   Medications:  (Selected)   Prescriptions  Prescribed  amitriptyline 50 mg oral tablet: 50 mg = 1 tab(s), Oral, Once a day (at bedtime), # 7 tab(s), 0 Refill(s)  atorvastatin 40 mg oral tablet: 0.5 tab, Oral, Daily, # 4 tab(s), 0 Refill(s)  Documented Medications  Documented  LEVOFLOXACIN TAB 500M mg = 1 tab(s), Oral, Daily  OSELTAMIVIR  CAP 75M mg = 1 cap(s), Oral, BID  PREDNISONE   TAB 20MG:   Pantoprazole 40 mg ORAL EC-Tablet: 40 mg = 1 tab(s), Oral, Daily, # 90 tab(s), 3 Refill(s)  aspirin 81 mg oral Delayed Release (EC) tablet: 81 mg = 1 tab(s), Oral, Daily, # 30 tab(s), 0 Refill(s)  carvedilol 12.5 mg oral tablet: 6.25 mg = 0.5 tab(s), Oral, BID, # 90 tab(s), 0 Refill(s)  docusate calcium 240 mg oral capsule: 240 mg = 1 cap(s), Oral, Daily, PRN PRN for constipation, 0 Refill(s)  midodrine 5 mg oral tablet: 10 mg = 2 tab(s), Oral, BID, # 180 tab(s), 3 Refill(s)  ranitidine 150 mg oral tablet: 150 mg = 1 tab(s), Oral, BID, # 180 tab(s), 0 Refill(s)  risperidone 1 mg oral tablet: 1 mg = 1 tab(s), Oral, Daily, # 30 tab(s), 0 Refill(s)  terazosin 2 mg oral capsule: 2 mg = 1 cap(s), Oral, Once a day (at bedtime), # 90 cap(s), 0 Refill(s).      Past Medical/ Family/ Social History   Medical history:    Active  Pneumococcal pneumonia (318623432)  Bilateral hearing loss ([id number])  High cholesterol ([id number])  CHF - Congestive heart failure  (586830420)  Poor peripheral circulation (0007052)  COPD (99376222)  Tremors of nervous system ([id number])  Falls (361141389)  Resolved  Chest pain (92998428):  Resolved on 2/3/2016 at 80 years.  Comments:  8/20/2015 CDT 14:46 CDT - SYSTEM  Problem automatically updated based on documentation on Capillary Refills, Brachial Pulses, Radial Pulses, Femoral Pulses, Dorsalis Pulses, Ulnar pulses, Popliteal Pulses, Postibial Pulses, Edemas, Affect/Behavior, Orientation Assessment, Arterial Line Site.  Cataracts ([id number]):  Resolved on 8/20/2015 at 80 years.  Comments:  8/20/2015 CDT 14:46 CDT - SYSTEM  Problem automatically updated based on documentation on Capillary Refills, Brachial Pulses, Radial Pulses, Femoral Pulses, Dorsalis Pulses, Ulnar pulses, Popliteal Pulses, Postibial Pulses, Edemas, Affect/Behavior, Orientation Assessment, Arterial Line Site.  DVT (deep venous thrombosis) ([id number]):  Resolved on 8/20/2015 at 80 years.  Comments:  12/23/2014 CST 0:57 CST - Evonne Ebony OLEA  Right letg    8/20/2015 CDT 14:46 CDT - SYSTEM  Problem automatically updated based on documentation on Capillary Refills, Brachial Pulses, Radial Pulses, Femoral Pulses, Dorsalis Pulses, Ulnar pulses, Popliteal Pulses, Postibial Pulses, Edemas, Affect/Behavior, Orientation Assessment, Arterial Line Site.  Pulmonary emboli ([id number]):  Resolved on 8/20/2015 at 80 years.  Comments:  8/20/2015 CDT 14:46 CDT - SYSTEM  Problem automatically updated based on documentation on Capillary Refills, Brachial Pulses, Radial Pulses, Femoral Pulses, Dorsalis Pulses, Ulnar pulses, Popliteal Pulses, Postibial Pulses, Edemas, Affect/Behavior, Orientation Assessment, Arterial Line Site..   Surgical history:    Cataract surgery ([id number]) in 2014 at 78 Years.  Comments:  12/23/2014 00:59 - Evonne Ebony OLEA  both eyes  Lumbar discectomy (386530000) in 2011 at 75  Years.  Arthroplasty of hip, total, with use of methyl methacrylate ([id number]) on 1/1/2010 at 74 Years.  Comments:  3/22/2012 04:22 - Cristina OLEA, Elke ELLIS  LEFT HIP  THUMB SURGERY.  CABG - Coronary artery bypass graft ([id number]).  ptca..   Family history:    Acute myocardial infarction.  Mother  Congestive heart disease.  Father  Alcoholism.  Father  .   Social history:    Social & Psychosocial Habits    Alcohol  03/22/2012 Risk Assessment: Denies Alcohol Use    05/09/2017  Use: Past    Type: Beer    Frequency: 1-2 times per month    12/07/2017  Use: Past    Type: Beer    Substance Abuse  03/22/2012 Risk Assessment: Denies Substance Abuse    07/31/2016  Use: Never    Tobacco  01/28/2016 Risk Assessment: Denies Tobacco Use    08/16/2016  Use: Former smoker    Type: Cigarettes    05/28/2017  Use: Current some day smoker    Type: Cigarettes    Comment: QUIT COUPLE WEEKS AGO - 05/28/2017 14:20 - Andrés OLEA, Linda RUIZ  , Alcohol use: Denies, Tobacco use: Denies, Drug use: Denies.   Problem list:    Active Problems (10)  ADL   Bilateral hearing loss   CHF - Congestive heart failure   COPD   Falls   High cholesterol   Pneumococcal pneumonia   Poor peripheral circulation   Tobacco user   Tremors of nervous system   .      Physical Examination               Vital Signs   Vital Signs   5/1/2018 20:01 CDT       Temperature Oral          37.2 DegC                             Temperature Oral (calculated)             98.96 DegF                             Peripheral Pulse Rate     104 bpm  HI                             Respiratory Rate          17 br/min                             SpO2                      95 %                             Oxygen Therapy            Room air                             Systolic Blood Pressure   116 mmHg                             Diastolic Blood Pressure  80 mmHg  .      Vital Signs (last 24 hrs)_____  Last Charted___________  Temp Oral     37.2 DegC  (MAY 01 20:01)  Heart Rate Peripheral   H  104bpm  (MAY 01 20:01)  Resp Rate         17 br/min  (MAY 01 20:01)  SBP      116 mmHg  (MAY 01 20:01)  DBP      80 mmHg  (MAY 01 20:01)  SpO2      95 %  (MAY 01 20:01)  .   General:  Alert, no acute distress.    Skin:  Warm, dry, intact, no rash.    Head:  Atraumatic.   Neck:  Supple.   Eye:  Pupils are equal, round and reactive to light, extraocular movements are intact, normal conjunctiva, vision grossly normal.    Ears, nose, mouth and throat:  Oral mucosa moist.   Cardiovascular:  Regular rate and rhythm, No murmur.    Respiratory:  Lungs are clear to auscultation, respirations are non-labored.    Gastrointestinal:  Soft, Non distended, Tenderness: Moderate, left lower quadrant, Guarding: Negative, Rebound: Negative, Bowel sounds: Hyperactive.    Back:  Normal range of motion.   Musculoskeletal:  Normal ROM, normal strength.    Neurological:  Alert and oriented to person, place, time, and situation, No focal neurological deficit observed.       Medical Decision Making   Differential Diagnosis:  Bowel obstruction, ureteral stone, cholecystitis, hepatitis, pancreatitis, irritable bowel syndrome, urinary tract infection, pyelonephritis.    Documents reviewed:  Emergency department nurses' notes.   Orders  Launch Orders   Laboratory:  Lipase Level (Order): Stat collect, 5/1/2018 21:01 CDT, Blood, Lab Collect, 5/1/2018 21:01 CDT  CMP (Order): Stat collect, 5/1/2018 21:00 CDT, Blood, Lab Collect, 5/1/2018 21:00 CDT  CBC w/ Auto Diff (Order): Now collect, 5/1/2018 21:00 CDT, Blood, Lab Collect, 5/1/2018 21:00 CDT  Patient Care:  Cardiac Monitoring (Order): 5/1/2018 21:01 CDT, Constant Order  Saline Lock Insert (Order): 5/1/2018 21:00 CDT, Constant order  Pharmacy:  [id number] 500 mL (Order): 500 mL, 500 mL, IV, start date 5/1/2018 21:01 CDT, stop date 5/1/2018 21:01 CDT  Radiology:  CT Abdomen and Pelvis W Contrast (Order): Stat, 5/1/2018 21:00 CDT, Abdominal Pain, None, Wheelchair, Patient Has IV?, Rad Type, Oral  Contrast, Schedule this test, [facility], Launch Orders   Pharmacy:  Vancomycin (for IVPB) (Order): 1 gm, IV, Once, first dose 5/2/2018 0:00 CDT, stop date 5/2/2018 0:00 CDT  Zosyn 3.375 gm (for IVPB) (Order): 337,5mg, IV, Once, first dose 5/2/2018 0:00 CDT, stop date 5/2/2018 0:00 CDT  Admit/Transfer/Discharge:  Admit to Outpatient Observation (Order): 5/1/2018 23:17 CDT, Brenton LEO, [facility], No.    Electrocardiogram:  Time 5/1/2018 21:30:00, rate 125, EP Interp, The Rhythm is sinus tachycardia.  , QRS interval Right bundle branch block.    Electrocardiogram:  Time 5/1/2018 22:44:00, rate 120, No ST-T changes, no ectopy, The Rhythm is sinus tachycardia.  , QRS interval Right bundle branch block.    Results review:     No qualifying data available, Interpretation Labs unremarkable.    Chest X-Ray:  No acute disease process, interpretation by Emergency Physician.    Radiology results:  Rad Results (ST)  < 12 hrs   Accession: [id number]  Order: CT Abdomen and Pelvis W Contrast  Report Dt/Tm: 05/01/2018 22:28  Report:      CT abdomen pelvis with contrast     Technique: Images of the abdomen and pelvis were obtained with  contrast.     Total DLP: 925.7 mGy cm      Indication: Abdominal pain.     Comparison: CT abdomen pelvis 5/28/2017.     Findings:   There is scarring in the bilateral lung bases. The heart is enlarged.     The liver is homogeneous. The gallbladder is normal. The spleen,  pancreas, and adrenal glands are normal. The bilateral kidneys are  normal.     There are surgical clips at the GE junction. The stomach and small  bowel are decompressed. The appendix is normal. The colon is normal.  The urinary bladder is normal. There is no pelvic or retroperitoneal  lymphadenopathy. The aorta is nonaneurysmal. There is an IVC filter in  place. There are postoperative changes of left total hip arthroplasty.  There is a lytic or blastic osseous lesion.             Impression:  1.  No acute abnormality of the abdomen and pelvis.  2. Bilateral lower lobe scarring.      .      Impression and Plan   Diagnosis   Fever (ZIA55-VX R50.9)      Calls-Consults   -  5/1/2018 23:17:00 , burak DILL.    Plan   Condition: Stable.    Disposition: Admit time  5/1/2018 23:16:00, Place in Observation Unit.

## 2022-04-29 NOTE — DISCHARGE SUMMARY
Patient:   Paulie Madrigal             MRN: [id number]            FIN: [id number]               Age:   84 years     Sex:  Male     :  1935   Associated Diagnoses:   None   Author:   Joshua Amaya MD      Discharge Information      Discharge Summary Information   Admitted  2019   Discharged  2019   Admitting physician     Joshua Amaya MD.     Admitting diagnosis (REPEATED FALLS  DECONDITIONING  TREMORS  PRESBYACUSIA  DEMENTIA  COPD  CAD H/O CABG  HTN  HLD  ANXIETY  TREMORS)      Physical Examination   General:  Alert and oriented.    Eye:  Pupils are equal, round and reactive to light, Extraocular movements are intact, Normal conjunctiva, Vision unchanged.    HENT:  Normocephalic, Normal hearing, Oral mucosa is moist, No pharyngeal erythema.    Neck:  Supple, Non-tender, No carotid bruit.    Respiratory:  Lungs are clear to auscultation, Respirations are non-labored, Breath sounds are equal, No chest wall tenderness.    Cardiovascular:  Normal rate, Regular rhythm, No murmur, No gallop.    Gastrointestinal:  Soft, Non-tender, Non-distended, Normal bowel sounds, No organomegaly.    Genitourinary:  No costovertebral angle tenderness, No inguinal tenderness, No urethral discharge.       Vital Signs (last 24 hrs)_____  Last Charted___________  Temp Oral     36.8 DegC  (JUL 11 15:55)  Heart Rate Peripheral   92 bpm  (JUL 11 15:55)  Resp Rate         16 br/min  (:13)  SBP      105 mmHg  (JUL 11 15:55)  DBP      L 59mmHg  (JUL 11 15:55)  SpO2      97 %  (JUL 11 15:)     Lymphatics:  No lymphadenopathy neck, axilla, groin.    Musculoskeletal:  Normal range of motion, Normal strength.    Neurologic:  Alert, Oriented, Normal sensory, Normal motor function, No focal deficits, Cranial Nerves II-XII are grossly intact.    Psychiatric:  Cooperative, Appropriate mood & affect, Normal judgment, Non-suicidal.       Hospital Course   Chief Complaint   FALL AND RIB PAIN      History of Present  Illness   85 y/o male well known to me with significant medical hsitory of anxiety, osteoarthritis, presbyacusia, dementia, cad. h/o cabg, h/o dvt and pe not on any anticoagulation at this time presented to the ed with complaints of worsening rib pain and repeated falls with imbalance and weakness and further work up suggestive of bonnie and severe deconditioning and further admitted for pain control therapy and possible placement    TODAY'S INFO : SEEN AND EXAMINED, TOLERATING THERAPY AND REFUSING PLACEMENT      REPEATED FALLS  DECONDITIONING  TREMORS  PRESBYACUSIA  DEMENTIA  COPD  CAD H/O CABG  HTN  HLD  ANXIETY  TREMORS    PLAN :  CONTINUE WITH THERAPY  PAIN CONTROL  FOLLOW IMAGING  LABS IN AM  PT AND OT RECC  DC HOME WITH HH  DC TIME 32 MIN
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
## 2022-04-29 NOTE — DISCHARGE SUMMARY
Patient:   Paulie Madrigal             MRN: [id number]            FIN: [id number]               Age:   83 years     Sex:  Male     :  1935   Associated Diagnoses:   Abdominal pain; CAD (coronary artery disease); Constipation; Emphysema/COPD; History of BPH; HLD (hyperlipidemia); HTN (hypertension); Hx of CABG   Author:   Melonie Beltran MD      Results Review   General results   Most recent results   Discrete results only   5/3/2018 6:56 CDT        WBC                       6.0 x10(3)/mcL                             RBC                       3.30 x10(6)/mcL  LOW                             Hgb                       10.2 gm/dL  LOW                             Hct                       32.3 %  LOW                             Platelet                  290 x10(3)/mcL                             MCV                       97.9 fL  HI                             MCH                       30.9 pg                             MCHC                      31.6 gm/dL  LOW                             RDW                       13.6 %                             MPV                       10.2 fL                             Abs Neut                  3.59 x10(3)/mcL                             Neutro Auto               59 %  NA                             Lymph Auto                22 %  NA                             Mono Auto                 12 %  NA                             Eos Auto                  5 %  NA                             Abs Eos                   0.3 x10(3)/mcL                             Basophil Auto             1 %  NA                             Abs Neutro                3.59 x10(3)/mcL                             Abs Lymph                 1.3 x10(3)/mcL                             Abs Mono                  0.7 x10(3)/mcL                             Abs Baso                  0.1 x10(3)/mcL                             Sodium Lvl                142 mmol/L                             Potassium Lvl              4.9 mmol/L                             Chloride                  111 mmol/L  HI                             CO2                       25.0 mmol/L                             Calcium Lvl               8.4 mg/dL  LOW                             Glucose Lvl               91 mg/dL                             BUN                       24.0 mg/dL  HI                             Creatinine                1.16 mg/dL                             BUN/Creat Ratio           20.7  NA                             eGFR-AA                   >60 mL/min/1.73 m2  NA                             eGFR-OSMAR                  >60 mL/min/1.73 m2  NA           Discharge Information      Discharge Summary Information   Admitted  5/2/2018   Discharged  5/3/2018   Admitting physician     Sourav LEO, Naeem LEE     Referring physician for admission     Phyllis LEO, Tomer Martinez.     Discharge diagnosis     Abdominal pain (PNED [id number]).     CAD (coronary artery disease) (VXA57-GB I25.10).     Constipation (MJB06-MX K59.00).     Emphysema/COPD (TIB94-XV J43.9).     History of BPH (VGM97-FS Z87.438).     HLD (hyperlipidemia) (TNG94-IW E78.5).     HTN (hypertension) (KMC21-XP I10).     Hx of CABG (PBI47-RZ Z95.1).        Physical Examination   Vital Signs   5/3/2018 13:18 CDT       Temperature Oral          36.6 DegC                             Temperature Oral (calculated)             97.88 DegF                             Peripheral Pulse Rate     80 bpm                             Heart Rate Monitored      68 bpm                             Respiratory Rate          20 br/min                             SpO2                      98 %                             Oxygen Therapy            Room air                             Systolic Blood Pressure   130 mmHg                             Diastolic Blood Pressure  72 mmHg                             Mean Arterial Pressure, Cuff              91 mmHg     General:  Alert and  oriented.    Eye:  Pupils are equal, round and reactive to light.    HENT:  Normocephalic.    Neck:  Supple, Non-tender.    Respiratory:  Lungs are clear to auscultation, Respirations are non-labored.    Cardiovascular:  Normal rate, Regular rhythm.    Gastrointestinal:  Soft, Non-tender, Non-distended, Normal bowel sounds.    Integumentary:  Warm, Pink.    Neurologic:  Alert, Oriented, No focal deficits.       Hospital Course   Hospital Course   Admitted from: from emergency department.     Admitting diagnosis: Abdominal pain (PNED [id number]), Constipation (CTX25-EG K59.00).     Admission disposition: admit to medical bed.     Length of stay: days  2.     Medical management.     83-year-old  male with significant past medical history of CAD status post CABG, COPD, HLD, HTN, BPH was admitted to hospitalist service on 5/2/2018 with severe abdominal pain.  Patient is hemodynamically stable.  Labs unremarkable.  Recent CT abdomen done on 5/1/2018 showed no acute abnormality.  Patient was admitted for abdominal pain likely secondary to constipation with significantly improved with lactulose.  Patient had multiple bowel movements after lactulose and he felt symptomatically better.  Hemodynamically stable.  No new symptoms.  Abdominal examination completely benign.  No fever, chills.  Since hemodynamically and symptomatically stable but was decided to discharge the patient home.  Continued when necessary laxatives.  Given prescription for the same.  Continue home meds including aspirin, statin, beta blocker for CAD.  Further medication optimization to be done as outpatient by primary care physician.  Primary care physician appointment was set up before discharge.  All treatment plans discussed with the patient & he voiced understanding the everything explained      Discharge Plan   Discharge Summary Plan   Discharge Status: improved.     Discharge instructions given: to patient.      Discharge disposition: discharge to home.     Prescriptions: continue same medications, reviewed with patient, written and given to patient.     Course   Improving.     Progressing as expected.     Well controlled.     Education and Follow-up   Counseled: patient.     Discharge Planning: Coronary Artery Disease, Male, Constipation, Adult, Easy-to-Read, Joshua Amaya 5/16/2018 13:45:00; PCP in 1 week; Anytime the conditions worsen, return to clinic or go to ED.     DC Time was 36 mnts

## 2022-04-29 NOTE — H&P
Patient:   Paulie Madrigal             MRN: [id number]            FIN: [id number]               Age:   83 years     Sex:  Male     :  1935   Associated Diagnoses:   None   Author:   Naeem Benjamin MD      Basic Information   Source of history:  Self.       Chief Complaint      History of Present Illness   83-year-old male presents with a 5 day history of left lower quadrant abdominal pain, constipation, no associated nausea vomiting.  Patient has a good appetite.  Subjective fever chills ×1 week, low-grade fever on admission given Tylenol and has been afebrile.  CT abdomen normal.      Review of Systems   Constitutional:  Negative except as documented in history of present illness.    Eye:  No icterus, No blurring.    Ear/Nose/Mouth/Throat:  No decreased hearing, No nasal congestion.    Respiratory:  Shortness of breath, Cough, Chronic .    Cardiovascular:  No chest pain, No palpitations.    Gastrointestinal:  Negative except as documented in history of present illness.    Genitourinary:  No dysuria, No hematuria.    Hematology/Lymphatics:  No bruising tendency, No bleeding tendency.    Endocrine:  No excessive thirst, No polyuria.    Immunologic:  Not immunocompromised, No recurrent fevers.    Musculoskeletal:  No back pain, No neck pain.    Integumentary:  No rash, No pruritus.    Neurologic:  Alert and oriented X4, No abnormal balance.    Psychiatric:  No anxiety, No depression.       Health Status   Allergies:    Allergies (1) Active Reaction  sulfa drugs hives     Current medications:  (Selected)   Inpatient Medications  Ordered  Toradol: 15 mg, form: Injection, IV Push, q6hr PRN for pain, moderate, Order duration: 5 day(s), first dose 18 1:51:00 CDT, stop date 18 1:50:00 CDT  Zofran: 4 mg, form: Injection, IV Push, q4hr PRN for nausea, first dose 18 1:51:00 CDT, choose first if ordered with other treatments for nausea  acetaminophen: 650 mg, form: Tab, Oral, q6hr PRN for fever,  first dose 18 1:51:00 CDT, > 38.1 degrees Celsius  albuterol: 3 mL, 2.5 mg =, form: Soln-Inh, NEB, q4hr Resp, first dose 18 2:00:00 CDT  labetalol: 20 mg, form: Soln, IV Push, q2hr PRN for hypertension, first dose 18 1:51:00 CDT, SBP > _160___ and/or DBP > _100___ not to exceed 300mg/24hrs  Prescriptions  Prescribed  amitriptyline 50 mg oral tablet: 50 mg = 1 tab(s), Oral, Once a day (at bedtime), # 7 tab(s), 0 Refill(s)  atorvastatin 40 mg oral tablet: 0.5 tab, Oral, Daily, # 4 tab(s), 0 Refill(s)  Documented Medications  Documented  LEVOFLOXACIN TAB 500M mg = 1 tab(s), Oral, Daily  OSELTAMIVIR  CAP 75M mg = 1 cap(s), Oral, BID  PREDNISONE   TAB 20MG:   Pantoprazole 40 mg ORAL EC-Tablet: 40 mg = 1 tab(s), Oral, Daily, # 90 tab(s), 3 Refill(s)  aspirin 81 mg oral Delayed Release (EC) tablet: 81 mg = 1 tab(s), Oral, Daily, # 30 tab(s), 0 Refill(s)  carvedilol 12.5 mg oral tablet: 6.25 mg = 0.5 tab(s), Oral, BID, # 90 tab(s), 0 Refill(s)  docusate calcium 240 mg oral capsule: 240 mg = 1 cap(s), Oral, Daily, PRN PRN for constipation, 0 Refill(s)  midodrine 5 mg oral tablet: 10 mg = 2 tab(s), Oral, BID, # 180 tab(s), 3 Refill(s)  ranitidine 150 mg oral tablet: 150 mg = 1 tab(s), Oral, BID, # 180 tab(s), 0 Refill(s)  risperidone 1 mg oral tablet: 1 mg = 1 tab(s), Oral, Daily, # 30 tab(s), 0 Refill(s)  terazosin 2 mg oral capsule: 2 mg = 1 cap(s), Oral, Once a day (at bedtime), # 90 cap(s), 0 Refill(s),    Medications (5) Active  Scheduled: (1)  albuterol 0.083% Sol 3 mL UD  2.5 mg 3 mL, NEB, q4hr Resp  Continuous: (0)  PRN: (4)  acetaminophen 325 mg Tab  650 mg 2 tab(s), Oral, q6hr  ketorolac 30 mg/mL Sol  15 mg 0.5 mL, IV Push, q6hr  labetalol 5 mg/mL 20mL vial  20 mg 4 mL, IV Push, q2hr  ondansetron 2 mg/mL inj - 2mL  4 mg 2 mL, IV Push, q4hr        Histories   Past Medical History -   Past Social History -   Past Family History -   Past surgical History -    Past Medical History:     Active  Pneumococcal pneumonia ([id number])  Bilateral hearing loss ([id number])  High cholesterol ([id number])  CHF - Congestive heart failure ([id number])  Poor peripheral circulation ([id number])  COPD (00393979)  Tremors of nervous system ([id number])  Falls (874671116)  Resolved  Chest pain (98808033):  Resolved on 2/3/2016 at 80 years.  Comments:  8/20/2015 CDT 14:46 CDT - SYSTEM  Problem automatically updated based on documentation on Capillary Refills, Brachial Pulses, Radial Pulses, Femoral Pulses, Dorsalis Pulses, Ulnar pulses, Popliteal Pulses, Postibial Pulses, Edemas, Affect/Behavior, Orientation Assessment, Arterial Line Site.  Cataracts ([id number]):  Resolved on 8/20/2015 at 80 years.  Comments:  8/20/2015 CDT 14:46 CDT - SYSTEM  Problem automatically updated based on documentation on Capillary Refills, Brachial Pulses, Radial Pulses, Femoral Pulses, Dorsalis Pulses, Ulnar pulses, Popliteal Pulses, Postibial Pulses, Edemas, Affect/Behavior, Orientation Assessment, Arterial Line Site.  DVT (deep venous thrombosis) ([id number]):  Resolved on 8/20/2015 at 80 years.  Comments:  12/23/2014 CST 0:57 CST - Evonne Ebony OLEA    8/20/2015 CDT 14:46 CDT - SYSTEM  Problem automatically updated based on documentation on Capillary Refills, Brachial Pulses, Radial Pulses, Femoral Pulses, Dorsalis Pulses, Ulnar pulses, Popliteal Pulses, Postibial Pulses, Edemas, Affect/Behavior, Orientation Assessment, Arterial Line Site.  Pulmonary emboli ([id number]):  Resolved on 8/20/2015 at 80 years.  Comments:  8/20/2015 CDT 14:46 CDT - SYSTEM  Problem automatically updated based on documentation on Capillary Refills, Brachial Pulses, Radial Pulses, Femoral Pulses, Dorsalis Pulses, Ulnar pulses, Popliteal Pulses, Postibial Pulses, Edemas, Affect/Behavior, Orientation Assessment, Arterial Line Site.   Family  History:    Acute myocardial infarction.  Mother  Congestive heart disease.  Father  Alcoholism.  Father     Procedure history:    Cataract surgery ([id number]) in 2014 at 78 Years.  Comments:  12/23/2014 00:59 - Ebony Douglass RN  both eyes  Lumbar discectomy ([id number]) in 2011 at 75 Years.  Arthroplasty of hip, total, with use of methyl methacrylate (15689969) on 1/1/2010 at 74 Years.  Comments:  3/22/2012 04:22 - Elke Evans RN  LEFT HIP  THUMB SURGERY.  CABG - Coronary artery bypass graft ([id number]).  ptca.   Social History        Social & Psychosocial Habits    Alcohol  03/22/2012 Risk Assessment: Denies Alcohol Use    05/09/2017  Use: Past    Type: Beer    Frequency: 1-2 times per month    12/07/2017  Use: Past    Type: Beer    Substance Abuse  03/22/2012 Risk Assessment: Denies Substance Abuse    07/31/2016  Use: Never    Tobacco  01/28/2016 Risk Assessment: Denies Tobacco Use    08/16/2016  Use: Former smoker    Type: Cigarettes    05/28/2017  Use: Current some day smoker    Type: Cigarettes    Comment: QUIT COUPLE WEEKS AGO - 05/28/2017 14:20 - Linda Bowen RN        Physical Examination   General:  Alert and oriented, No acute distress.    Eye:  Pupils are equal, round and reactive to light, Extraocular movements are intact.    HENT:  Normocephalic, Tympanic membranes are clear.    Neck:  Supple, Non-tender, No jugular venous distention.    Respiratory:  Lungs are clear to auscultation, Respirations are non-labored.    Cardiovascular:  Normal rate, Regular rhythm.    Gastrointestinal:  Soft, Hyperactive bowel sounds, left lower quadrant tenderness no guarding or rebound.       Vital Signs (last 24 hrs)_____  Last Charted___________  Temp Oral     36.5 DegC  (MAY 02 02:22)  Heart Rate Peripheral   86 bpm  (MAY 02 04:31)  Resp Rate         19 br/min  (MAY 02 04:31)  SBP      113 mmHg  (MAY 02 02:22)  DBP      67 mmHg  (MAY 02 02:22)  SpO2      97 %  (MAY 02 04:31)     Genitourinary:  No  costovertebral angle tenderness, No inguinal tenderness.    Lymphatics:  No lymphadenopathy neck, axilla, groin.    Musculoskeletal:  Normal range of motion, Normal strength.    Integumentary:  Warm, Dry, Pink.    Neurologic:  Oriented, Normal sensory, Normal motor function, Cranial Nerves II-XII are grossly intact.    Cognition and Speech:  Oriented, Speech clear and coherent, Functional cognition intact.    Psychiatric:  Cooperative, Appropriate mood & affect.       Review / Management   Results review:     Labs (Last four charted values)  Glucose              88 (MAY 01) .    Laboratory Results   Today's Lab Results : PowerNote Discrete Results   5/1/2018 22:11 CDT       UA Appear                 CLEAR                             UA Color                  YELLOW                             UA Spec Grav              1.020                             UA Bili                   Negative                             UA pH                     5.5                             UA Urobilinogen           Negative                             UA Blood                  Negative                             UA Glucose                Negative                             UA Ketones                Negative                             UA Protein                Trace                             UA Nitrite                Negative                             UA Leuk Est               Negative                             UA WBC                    0                             UA RBC                    0-1 /HPF                             UA Bacteria               None Seen                             UA Squam Epithelial       None Seen /LPF    5/1/2018 21:55 CDT       Lactic Acid Lvl           1.2 mmol/L    5/1/2018 21:03 CDT       WBC                       10.2 x10(3)/mcL                             RBC                       3.65 x10(6)/mcL  LOW                             Hgb                       11.0 gm/dL  LOW                              Hct                       35.3 %  LOW                             Platelet                  315 x10(3)/mcL                             MCV                       96.7 fL  HI                             MCH                       30.1 pg                             MCHC                      31.2 gm/dL  LOW                             RDW                       13.3 %                             MPV                       10.6 fL  HI                             Abs Neut                  7.26 x10(3)/mcL                             Neutro Auto               71.1 %                             Lymph Auto                21.2 %                             Mono Auto                 3.1 %  LOW                             Eos Auto                  3.2 %                             Abs Eos                   0.33 x10(3)/mcL                             Basophil Auto             0.8 %                             Abs Neutro                7.26 x10(3)/mcL                             Abs Lymph                 2.16 x10(3)/mcL                             Abs Mono                  0.32 x10(3)/mcL                             Abs Baso                  0.08 x10(3)/mcL                             NRBC%                     0.0 %                             IG%                       0.600 %  HI                             IG#                       0.0600  HI                             Sodium Lvl                139 mmol/L                             Potassium Lvl             4.4 mmol/L                             Chloride                  106 mmol/L                             CO2                       26.0 mmol/L                             Calcium Lvl               8.6 mg/dL                             Glucose Lvl               88 mg/dL                             BUN                       40.0 mg/dL  HI                             Creatinine                1.69 mg/dL  HI                             eGFR-AA                   50 mL/min/1.73  m2  NA                             eGFR-OSMAR                  41 mL/min/1.73 m2  NA                             Bili Total                0.6 mg/dL                             Bili Direct               0.20 mg/dL                             Bili Indirect             0.40 mg/dL                             AST                       20 unit/L                             ALT                       15 unit/L  LOW                             Alk Phos                  117 unit/L                             Total Protein             7.5 gm/dL                             Albumin Lvl               3.4 gm/dL                             Globulin                  4 gm/dL                             A/G Ratio                 1  NA                             Lipase Lvl                134 unit/L     , Reviewed labs   Chest x-ray results   Reviewed radiologist's report   Radiology results   Rad Results (ST)   Accession: [id number]  Order: CT Abdomen and Pelvis W Contrast  Report Dt/Tm: 05/01/2018 22:28  Report:      CT abdomen pelvis with contrast     Technique: Images of the abdomen and pelvis were obtained with  contrast.     Total DLP: 925.7 mGy cm      Indication: Abdominal pain.     Comparison: CT abdomen pelvis 5/28/2017.     Findings:   There is scarring in the bilateral lung bases. The heart is enlarged.     The liver is homogeneous. The gallbladder is normal. The spleen,  pancreas, and adrenal glands are normal. The bilateral kidneys are  normal.     There are surgical clips at the GE junction. The stomach and small  bowel are decompressed. The appendix is normal. The colon is normal.  The urinary bladder is normal. There is no pelvic or retroperitoneal  lymphadenopathy. The aorta is nonaneurysmal. There is an IVC filter in  place. There are postoperative changes of left total hip arthroplasty.  There is a lytic or blastic osseous lesion.             Impression:  1. No acute abnormality of the abdomen and pelvis.  2.  Bilateral lower lobe scarring.         Documentation reviewed   DVT Prophyl - Lovenox      Impression and Plan   Education and Follow-up:       Counseled: Patient, Regarding diagnosis, Regarding treatment.    Left lower quadrant abdominal pain/constipation   Lactulose/Dulcolax suppository   Normal saline 1 L  Fever   Tylenol/monitor  COPD   DuoNeb/home meds   Chronic kidney disease  CAD  Hyperlipidemia

## 2022-05-02 ENCOUNTER — OFFICE VISIT (OUTPATIENT)
Dept: NEPHROLOGY | Facility: CLINIC | Age: 87
End: 2022-05-02
Payer: MEDICARE

## 2022-05-02 VITALS
OXYGEN SATURATION: 82 % | SYSTOLIC BLOOD PRESSURE: 128 MMHG | HEART RATE: 63 BPM | TEMPERATURE: 97 F | DIASTOLIC BLOOD PRESSURE: 64 MMHG | WEIGHT: 130 LBS | RESPIRATION RATE: 16 BRPM

## 2022-05-02 DIAGNOSIS — E87.5 HYPERKALEMIA: Primary | ICD-10-CM

## 2022-05-02 DIAGNOSIS — N18.32 STAGE 3B CHRONIC KIDNEY DISEASE: ICD-10-CM

## 2022-05-02 PROCEDURE — 99999 PR PBB SHADOW E&M-EST. PATIENT-LVL V: CPT | Mod: PBBFAC,,, | Performed by: INTERNAL MEDICINE

## 2022-05-02 PROCEDURE — 99215 OFFICE O/P EST HI 40 MIN: CPT | Mod: PBBFAC | Performed by: INTERNAL MEDICINE

## 2022-05-02 PROCEDURE — 99203 OFFICE O/P NEW LOW 30 MIN: CPT | Mod: S$PBB,,, | Performed by: INTERNAL MEDICINE

## 2022-05-02 PROCEDURE — 99999 PR PBB SHADOW E&M-EST. PATIENT-LVL V: ICD-10-PCS | Mod: PBBFAC,,, | Performed by: INTERNAL MEDICINE

## 2022-05-02 PROCEDURE — 99203 PR OFFICE/OUTPT VISIT, NEW, LEVL III, 30-44 MIN: ICD-10-PCS | Mod: S$PBB,,, | Performed by: INTERNAL MEDICINE

## 2022-05-02 RX ORDER — HYDROXYZINE PAMOATE 25 MG/1
25 CAPSULE ORAL
COMMUNITY

## 2022-05-02 RX ORDER — ATORVASTATIN CALCIUM 40 MG/1
40 TABLET, FILM COATED ORAL
COMMUNITY

## 2022-05-02 RX ORDER — NAPROXEN SODIUM 220 MG/1
81 TABLET, FILM COATED ORAL
COMMUNITY

## 2022-05-02 RX ORDER — OMEPRAZOLE 40 MG/1
40 CAPSULE, DELAYED RELEASE ORAL
COMMUNITY

## 2022-05-02 NOTE — PROGRESS NOTES
Subjective:       Patient ID: Paulie Madrigal is a 87 y.o. White male who [facility] for [facility] )    Pt with Hx of CKD, basline cr 1.2-1.4  Referred for eval  Also had ??high k presumably related to ACE(-)  Pt still on low dose lisinopril  Denies any new complaints  Has obvious mobility impairement and decrease in hearing    Review of Systems   Musculoskeletal: Positive for arthralgias and gait problem.   Neurological: Positive for dizziness and light-headedness.   All other systems reviewed and are negative.      Objective:      Physical Exam  Constitutional:       Appearance: He is ill-appearing.   HENT:      Head: Normocephalic and atraumatic.      Nose: Nose normal.      Mouth/Throat:      Mouth: Mucous membranes are moist.   Eyes:      Extraocular Movements: Extraocular movements intact.      Pupils: Pupils are equal, round, and reactive to light.   Cardiovascular:      Rate and Rhythm: Normal rate and regular rhythm.      Heart sounds: Normal heart sounds.   Pulmonary:      Effort: Pulmonary effort is normal.      Breath sounds: Normal breath sounds.   Abdominal:      General: Abdomen is flat.   Musculoskeletal:      Cervical back: Normal range of motion and neck supple.   Skin:     General: Skin is warm.   Neurological:      Mental Status: He is alert. Mental status is at baseline.      Gait: Gait abnormal.   Psychiatric:         Mood and Affect: Mood normal.         Assessment:       1. Hyperkalemia     2- likely CKD 2-3 related to nephrosclerosis  Plan:         DC lisinopril  Labs and renal US and FU in 4-6 weeks

## 2022-05-05 ENCOUNTER — HOSPITAL ENCOUNTER (EMERGENCY)
Facility: HOSPITAL | Age: 87
Discharge: NURSING, REHAB OR LONG-TERM CARE | End: 2022-05-05
Attending: EMERGENCY MEDICINE
Payer: OTHER GOVERNMENT

## 2022-05-05 VITALS
HEART RATE: 78 BPM | SYSTOLIC BLOOD PRESSURE: 132 MMHG | TEMPERATURE: 98 F | RESPIRATION RATE: 18 BRPM | WEIGHT: 150 LBS | BODY MASS INDEX: 23.54 KG/M2 | OXYGEN SATURATION: 98 % | DIASTOLIC BLOOD PRESSURE: 76 MMHG | HEIGHT: 67 IN

## 2022-05-05 DIAGNOSIS — Z91.89 AT RISK FOR ASPIRATION: Primary | ICD-10-CM

## 2022-05-05 DIAGNOSIS — T18.9XXA SWALLOWED FOREIGN BODY: ICD-10-CM

## 2022-05-05 PROCEDURE — 99283 EMERGENCY DEPT VISIT LOW MDM: CPT | Mod: 25

## 2022-05-05 RX ORDER — PANTOPRAZOLE SODIUM 40 MG/1
40 TABLET, DELAYED RELEASE ORAL DAILY
COMMUNITY
Start: 2022-01-31

## 2022-05-05 RX ORDER — ASPIRIN 81 MG/1
81 TABLET ORAL DAILY
COMMUNITY
Start: 2022-01-31

## 2022-05-05 RX ORDER — RANOLAZINE 500 MG/1
500 TABLET, EXTENDED RELEASE ORAL 2 TIMES DAILY
COMMUNITY
Start: 2022-01-25

## 2022-05-05 RX ORDER — FOLIC ACID 1 MG/1
1 TABLET ORAL DAILY
COMMUNITY
Start: 2022-01-25

## 2022-05-05 RX ORDER — ISOSORBIDE MONONITRATE 30 MG/1
60 TABLET, EXTENDED RELEASE ORAL DAILY
COMMUNITY
Start: 2022-01-31

## 2022-05-05 RX ORDER — HYDROXYUREA 500 MG/1
500 CAPSULE ORAL
COMMUNITY
Start: 2022-02-16

## 2022-05-05 NOTE — ED NOTES
Dr Rene at bedside to discuss results of xrays and discharge planning with pt and pt sitter from [facility]. Discussed negative xrays and to return to the emergency department if symptoms persist or worsen. Report called to [facility].

## 2022-05-05 NOTE — ED NOTES
Pt arrives to ED from nursing home due to possible ingestion of hearing aid battery. Pt GCS-14, normal per EMS staff. Pt respirations equal and nonlabored. Skin warm and dry. Abdomen soft and non-tender. Side rails up x2. Call bell within reach. Door open for visualization.

## 2022-05-05 NOTE — ED PROVIDER NOTES
"Encounter Date: 5/5/2022    SCRIBE #1 NOTE: I, Yenifer Palacios, am scribing for, and in the presence of,  Alisa Rene DO. I have scribed the following portions of the note - Other sections scribed: HPI, ROS, and physical examinationnnnnnnn.       History     Chief Complaint   Patient presents with    General Illness     Patient sent from [facility] for possibly swallowing his hearing aid battery at 0130 this AM.      87 y.o. male with a history of HTN and dementia, presents to ED via EMS from [facility] after he swallowed his hearing aid battery around 0130 last night. Pt admits to swallowing the battery. When asked why he did this pt smiles and states "I don't know." He has no complaints at this time. Denies abdominal pain, SOB, and difficulty swallowing.    The history is provided by the patient and the nursing home. No  was used.   Illness   Illness onset: last night. The problem has been unchanged. Nothing relieves the symptoms. Nothing aggravates the symptoms. Pertinent negatives include no fever, no nausea, no vomiting and no shortness of breath. He has received no recent medical care.     Review of patient's allergies indicates:   Allergen Reactions    Peas     Shellfish containing products     Sulfa (sulfonamide antibiotics)      Past Medical History:   Diagnosis Date    Hypertension     Unspecified dementia without behavioral disturbance      History reviewed. No pertinent surgical history.  History reviewed. No pertinent family history.  Social History     Tobacco Use    Smoking status: Never Smoker    Smokeless tobacco: Never Used   Substance Use Topics    Drug use: Not Currently     Review of Systems   Unable to perform ROS: Dementia   Constitutional: Negative for fever.   Respiratory: Negative for shortness of breath.    Gastrointestinal: Negative for nausea and vomiting.       Physical Exam     Initial Vitals [05/05/22 0732]   BP Pulse Resp Temp SpO2   129/79 77 " 18 98.2 °F (36.8 °C) 99 %      MAP       --         Physical Exam    Nursing note and vitals reviewed.  Constitutional: He appears well-developed and well-nourished. He is not diaphoretic. He does not appear ill. No distress.   Pleasant male   HENT:   Head: Normocephalic and atraumatic.   Right Ear: External ear normal.   Left Ear: External ear normal.   Nose: Nose normal.   Mouth/Throat: Uvula is midline, oropharynx is clear and moist and mucous membranes are normal.   Eyes: Conjunctivae and EOM are normal. Pupils are equal, round, and reactive to light.   Neck: Neck supple. No tracheal deviation present.   Normal range of motion.  Cardiovascular: Normal rate, regular rhythm and normal heart sounds.   Pulmonary/Chest: Breath sounds normal. No respiratory distress.   Abdominal: Abdomen is soft. Bowel sounds are normal. He exhibits no distension. There is no abdominal tenderness.   No right CVA tenderness.  No left CVA tenderness. There is no rebound.   Musculoskeletal:         General: Normal range of motion.      Cervical back: Normal range of motion and neck supple.      Lumbar back: Normal. No tenderness. Normal range of motion.     Neurological: He is alert and oriented to person, place, and time. He has normal strength. No sensory deficit. GCS score is 15. GCS eye subscore is 4. GCS verbal subscore is 5. GCS motor subscore is 6.   Skin: Skin is warm and dry. Capillary refill takes less than 2 seconds.   Psychiatric: He has a normal mood and affect. His mood appears not anxious.         ED Course   Procedures  Labs Reviewed - No data to display       Imaging Results          X-Ray Chest 1 View (Final result)  Result time 05/05/22 08:56:52    Final result by Tomer Potts MD (05/05/22 08:56:52)                 Impression:      Trace left effusion with postsurgical changes of median sternotomy.      Electronically signed by: Tomer Potts  Date:    05/05/2022  Time:    08:56             Narrative:     EXAMINATION:  XR CHEST 1 VIEW    CLINICAL HISTORY:  Foreign body of alimentary tract, part unspecified, initial encounter    TECHNIQUE:  Single view of the chest    COMPARISON:  07/04/2021    FINDINGS:  Trace left effusion is noted.    The cardiomediastinal silhouette demonstrates postsurgical changes of median sternotomy.    No acute osseous abnormality.                               X-Ray Abdomen AP 1 View (KUB) (Final result)  Result time 05/05/22 10:24:26    Final result by Paulie Edwards MD (05/05/22 10:24:26)                 Impression:      No acute findings.      Electronically signed by: Paulie Edwards  Date:    05/05/2022  Time:    10:24             Narrative:    EXAMINATION:  XR ABDOMEN AP 1 VIEW    CLINICAL HISTORY:  Foreign body of alimentary tract, part unspecified, initial encounter    TECHNIQUE:  AP View(s) of the abdomen was performed.    COMPARISON:  None    FINDINGS:  Two frontal images of the abdomen.  There is a nonspecific, nonobstructive bowel gas pattern.  No large pneumoperitoneum.  Moderate stool in the colon.  There is an IVC filter.  Bilateral hip arthroplasties.  There is some surgical clips around the GE junction.                              X-Rays:   Independently Interpreted Readings:   Chest X-Ray: New foreign bodies noted   Abdomen:   KUB of Abdomen - No foreign body noted     Medications - No data to display  Medical Decision Making:   Initial Assessment:   87-year-old male sent from the nursing home after he potentially swallowed his hearing aid battery.  Patient denies any complaints.  Independently Interpreted Test(s):   I have ordered and independently interpreted X-rays - see prior notes.  Clinical Tests:   Radiological Study: Ordered and Reviewed  ED Management:  X-rays were unremarkable and no foreign body noted.  Will discharge patient back to the nursing          Scribe Attestation:   Scribe #1: I performed the above scribed service and the documentation accurately  describes the services I performed. I attest to the accuracy of the note.    Attending Attestation:           Physician Attestation for Scribe:  Physician Attestation Statement for Scribe #1: I, Alisa Rene, DO, reviewed documentation, as scribed by Yenifer Palacios in my presence, and it is both accurate and complete.             ED Course as of 05/05/22 1158   Thu May 05, 2022   1104 No foreign bodies noted on CXR or KUB [KM]      ED Course User Index  [KM] Alisa Rene MD             Clinical Impression:   Final diagnoses:  [T18.9XXA] Swallowed foreign body  [Z91.89] At risk for aspiration (Primary)          ED Disposition Condition    Discharge Stable        ED Prescriptions     None        Follow-up Information     Follow up With Specialties Details Why Contact Info    [facility] - Emergency Dept Emergency Medicine  As needed, If symptoms worsen [address] [facility] [id number]  [phone]           Alisa Rene MD  05/05/22 1159

## 2022-05-05 NOTE — ED NOTES
Bed: 20  Expected date:   Expected time:   Means of arrival:   Comments:  EMS: 87M swallowed hearing aid battery

## 2022-05-24 ENCOUNTER — HOSPITAL ENCOUNTER (EMERGENCY)
Facility: HOSPITAL | Age: 87
Discharge: HOME | End: 2022-05-24
Attending: EMERGENCY MEDICINE
Payer: MEDICARE

## 2022-05-24 VITALS
RESPIRATION RATE: 20 BRPM | SYSTOLIC BLOOD PRESSURE: 116 MMHG | OXYGEN SATURATION: 94 % | DIASTOLIC BLOOD PRESSURE: 73 MMHG | TEMPERATURE: 98 F | HEART RATE: 88 BPM

## 2022-05-24 DIAGNOSIS — S70.02XA CONTUSION OF LEFT HIP, INITIAL ENCOUNTER: Primary | ICD-10-CM

## 2022-05-24 DIAGNOSIS — S50.02XA CONTUSION OF LEFT ELBOW, INITIAL ENCOUNTER: ICD-10-CM
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
## 2022-05-24 PROCEDURE — 99284 EMERGENCY DEPT VISIT MOD MDM: CPT | Mod: 25

## 2022-05-24 PROCEDURE — 25000003 PHARM REV CODE 250: Performed by: EMERGENCY MEDICINE

## 2022-05-24 RX ORDER — HYDROCODONE BITARTRATE AND ACETAMINOPHEN 5; 325 MG/1; MG/1
1 TABLET ORAL
Status: COMPLETED | OUTPATIENT
Start: 2022-05-24 | End: 2022-05-24

## 2022-05-24 RX ORDER — TRAMADOL HYDROCHLORIDE 50 MG/1
50 TABLET ORAL EVERY 6 HOURS PRN
Qty: 20 TABLET | Refills: 0 | Status: SHIPPED | OUTPATIENT
Start: 2022-05-24 | End: 2022-05-29

## 2022-05-24 RX ADMIN — HYDROCODONE BITARTRATE AND ACETAMINOPHEN 1 TABLET: 5; 325 TABLET ORAL at 05:05

## 2022-05-24 NOTE — ED NOTES
Pt arrives via [facility] complaining of left hip pain. Pt states that he was reaching for his cell phone on the tv when he fell from standing to sitting. He complains of left hip pain and left elbow pain. Pt states that he hit head but denies LOC.

## 2022-05-24 NOTE — ED TRIAGE NOTES
Pt states that he was reaching for his cell phone on the tv when he fell from standing to sitting. He complains of left hip pain and left elbow pain. Pt states that he hit head but denies LOC.

## 2022-05-24 NOTE — ED PROVIDER NOTES
Encounter Date: 5/24/2022       History     Chief Complaint   Patient presents with    Fall     Pt states that he was reaching for his cell phone on the tv when he fell from standing to sitting. He complains of left hip pain and left elbow pain. Pt states that he hit head but denies LOC.      The history is provided by the patient, the EMS personnel and the nursing home. No  was used.   Fall  The accident occurred just prior to arrival. The fall occurred while standing. He fell from a height of 3 to 5 ft. He landed on a hard floor. The point of impact was the left elbow and left hip. The pain is present in the left hip and left elbow. He was not ambulatory at the scene. There was no entrapment after the fall. There was no drug use involved in the accident. There was no alcohol use involved in the accident. Pertinent negatives include no back pain, no fever and no nausea. The symptoms are aggravated by pressure on the injury and use of the injured limb. He has tried nothing for the symptoms.     Review of patient's allergies indicates:   Allergen Reactions    Peas     Shellfish containing products     Sulfa (sulfonamide antibiotics)      Past Medical History:   Diagnosis Date    Hypertension     Unspecified dementia without behavioral disturbance      No past surgical history on file.  No family history on file.  Social History     Tobacco Use    Smoking status: Never Smoker    Smokeless tobacco: Never Used   Substance Use Topics    Drug use: Not Currently     Review of Systems   Constitutional: Negative for fever.   HENT: Negative for sore throat.    Respiratory: Negative for shortness of breath.    Cardiovascular: Negative for chest pain.   Gastrointestinal: Negative for nausea.   Genitourinary: Negative for dysuria.   Musculoskeletal: Negative for back pain.   Skin: Negative for rash.   Neurological: Negative for weakness.   Hematological: Does not bruise/bleed easily.   All other  systems reviewed and are negative.      Physical Exam     Initial Vitals [05/24/22 1706]   BP Pulse Resp Temp SpO2   116/73 88 18 98.1 °F (36.7 °C) (!) 94 %      MAP       --         Physical Exam    Nursing note and vitals reviewed.  Constitutional: He appears well-developed and well-nourished.   HENT:   Head: Normocephalic and atraumatic.   Right Ear: External ear normal.   Left Ear: External ear normal.   Nose: Nose normal.   Eyes: Conjunctivae and EOM are normal. Pupils are equal, round, and reactive to light.   Neck: Neck supple.   Normal range of motion.  Cardiovascular: Normal rate, regular rhythm, normal heart sounds and intact distal pulses.   Pulmonary/Chest: Breath sounds normal.   Abdominal: Abdomen is soft. Bowel sounds are normal.   Musculoskeletal:         General: Tenderness (left posterior elbow, small ecchymosis, no swelling, good ROM; left hip TTP, decreased ROM due to pain - no shortening or external rotation) present. Normal range of motion.      Cervical back: Normal range of motion and neck supple.     Neurological: He is alert and oriented to person, place, and time. He has normal strength. GCS score is 15. GCS eye subscore is 4. GCS verbal subscore is 5. GCS motor subscore is 6.   Skin: Skin is warm and dry. Capillary refill takes less than 2 seconds.   Psychiatric: He has a normal mood and affect. His behavior is normal. Judgment and thought content normal.         ED Course   Procedures  Labs Reviewed - No data to display       Imaging Results          X-Ray Hip 2 or 3 views Left (with Pelvis when performed) (Final result)  Result time 05/24/22 17:20:11    Final result by Sabina Melendez MD (05/24/22 17:20:11)                 Impression:      The bones are demineralized without appreciable acute osseous abnormality.      Electronically signed by: Sabina Melendez  Date:    05/24/2022  Time:    17:20             Narrative:    EXAMINATION:  XR HIP WITH PELVIS WHEN PERFORMED, 2 OR 3  VIEWS LEFT    CLINICAL HISTORY:  Unspecified fall, initial encounter    TECHNIQUE:  AP view of the pelvis and frog leg lateral view of the left hip were performed.    COMPARISON:  Left hip radiographs 11/09/2017    FINDINGS:  BONE: The bones are demineralized.  There are postsurgical changes of left femur ORIF.  Hardware appears engaged and intact.  No appreciable periprosthetic fracture.  Partially imaged right hip arthroplasty.  Degenerative changes are present in the lower lumbar spine.    SOFT TISSUES: Arterial atherosclerotic calcifications.                               X-Ray Elbow Complete Left (Final result)  Result time 05/24/22 17:18:46    Final result by Stephanie Witt MD (05/24/22 17:18:46)                 Impression:      No definite displaced fracture identified.      Electronically signed by: Stephanie Witt  Date:    05/24/2022  Time:    17:18             Narrative:    EXAMINATION:  XR ELBOW COMPLETE 3 VIEW LEFT    CLINICAL HISTORY:  Unspecified fall, initial encounter    COMPARISON:  None.    FINDINGS:  There is no definite displaced acute fracture identified.  There are degenerative changes of the elbow with osteophyte formation.  There is no elbow joint effusion.  The soft tissues are unremarkable.                                 Medications   HYDROcodone-acetaminophen 5-325 mg per tablet 1 tablet (1 tablet Oral Given 5/24/22 1707)                          Clinical Impression:   Final diagnoses:  [W19.XXXA] Fall  [S70.02XA] Contusion of left hip, initial encounter (Primary)  [S50.02XA] Contusion of left elbow, initial encounter  [W19.XXXA] Fall, initial encounter          ED Disposition Condition    Discharge Stable        ED Prescriptions     Medication Sig Dispense Start Date End Date Auth. Provider    traMADoL (ULTRAM) 50 mg tablet Take 1 tablet (50 mg total) by mouth every 6 (six) hours as needed for Pain. 20 tablet 5/24/2022 5/29/2022 Duarte Olson MD        Follow-up  Information     Follow up With Specialties Details Why Contact Info    Follow up with your primary MD in 3-5 days if not improved.  Return to ED for worsening symptoms.               Duarte Olson MD  05/24/22 2271

## 2022-05-25 NOTE — ED NOTES
Tramadol hard copy rx left in ED, verbal rx called into [facility] [phone] will deliver today, NH staff aware.

## 2022-05-26 ENCOUNTER — HOSPITAL ENCOUNTER (EMERGENCY)
Facility: HOSPITAL | Age: 87
Discharge: HOME | End: 2022-05-27
Attending: STUDENT IN AN ORGANIZED HEALTH CARE EDUCATION/TRAINING PROGRAM
Payer: MEDICARE

## 2022-05-26 DIAGNOSIS — M25.552 LEFT HIP PAIN: Primary | ICD-10-CM

## 2022-05-26 DIAGNOSIS — R53.1 WEAKNESS: ICD-10-CM

## 2022-05-26 PROCEDURE — 99285 EMERGENCY DEPT VISIT HI MDM: CPT | Mod: 25

## 2022-05-27 VITALS
WEIGHT: 130 LBS | OXYGEN SATURATION: 98 % | SYSTOLIC BLOOD PRESSURE: 114 MMHG | HEART RATE: 84 BPM | HEIGHT: 68 IN | DIASTOLIC BLOOD PRESSURE: 64 MMHG | TEMPERATURE: 98 F | RESPIRATION RATE: 20 BRPM | BODY MASS INDEX: 19.7 KG/M2

## 2022-05-27 LAB
ALBUMIN SERPL-MCNC: 3.3 GM/DL (ref 3.4–4.8)
ALBUMIN/GLOB SERPL: 1.1 RATIO (ref 1.1–2)
ALP SERPL-CCNC: 106 UNIT/L (ref 40–150)
ALT SERPL-CCNC: 12 UNIT/L (ref 0–55)
AST SERPL-CCNC: 17 UNIT/L (ref 5–34)
BASOPHILS # BLD AUTO: 0.12 X10(3)/MCL (ref 0–0.2)
BASOPHILS NFR BLD AUTO: 1.1 %
BILIRUBIN DIRECT+TOT PNL SERPL-MCNC: 1.5 MG/DL
BNP BLD-MCNC: 54.9 PG/ML
BUN SERPL-MCNC: 48.9 MG/DL (ref 8.4–25.7)
CALCIUM SERPL-MCNC: 9.9 MG/DL (ref 8.8–10)
CHLORIDE SERPL-SCNC: 93 MMOL/L (ref 98–107)
CO2 SERPL-SCNC: 33 MMOL/L (ref 23–31)
CREAT SERPL-MCNC: 1.37 MG/DL (ref 0.73–1.18)
EOSINOPHIL # BLD AUTO: 0.45 X10(3)/MCL (ref 0–0.9)
EOSINOPHIL NFR BLD AUTO: 4.2 %
ERYTHROCYTE [DISTWIDTH] IN BLOOD BY AUTOMATED COUNT: 17.3 % (ref 11.5–17)
GLOBULIN SER-MCNC: 3 GM/DL (ref 2.4–3.5)
GLUCOSE SERPL-MCNC: 85 MG/DL (ref 82–115)
HCT VFR BLD AUTO: 46.9 % (ref 42–52)
HGB BLD-MCNC: 15 GM/DL (ref 14–18)
IMM GRANULOCYTES # BLD AUTO: 0.09 X10(3)/MCL (ref 0–0.02)
IMM GRANULOCYTES NFR BLD AUTO: 0.8 % (ref 0–0.43)
LYMPHOCYTES # BLD AUTO: 1.65 X10(3)/MCL (ref 0.6–4.6)
LYMPHOCYTES NFR BLD AUTO: 15.5 %
MCH RBC QN AUTO: 28.5 PG (ref 27–31)
MCHC RBC AUTO-ENTMCNC: 32 MG/DL (ref 33–36)
MCV RBC AUTO: 89.2 FL (ref 80–94)
MONOCYTES # BLD AUTO: 1.18 X10(3)/MCL (ref 0.1–1.3)
MONOCYTES NFR BLD AUTO: 11.1 %
NEUTROPHILS # BLD AUTO: 7.1 X10(3)/MCL (ref 2.1–9.2)
NEUTROPHILS NFR BLD AUTO: 67.3 %
NRBC BLD AUTO-RTO: 0 %
PLATELET # BLD AUTO: 474 X10(3)/MCL (ref 130–400)
PMV BLD AUTO: 10.4 FL (ref 9.4–12.4)
POTASSIUM SERPL-SCNC: 4.9 MMOL/L (ref 3.5–5.1)
PROT SERPL-MCNC: 6.3 GM/DL (ref 5.8–7.6)
RBC # BLD AUTO: 5.26 X10(6)/MCL (ref 4.7–6.1)
SODIUM SERPL-SCNC: 133 MMOL/L (ref 136–145)
TROPONIN I SERPL-MCNC: <0.01 NG/ML (ref 0–0.04)
WBC # SPEC AUTO: 10.6 X10(3)/MCL (ref 4.5–11.5)

## 2022-05-27 PROCEDURE — 80053 COMPREHEN METABOLIC PANEL: CPT | Performed by: STUDENT IN AN ORGANIZED HEALTH CARE EDUCATION/TRAINING PROGRAM

## 2022-05-27 PROCEDURE — 93005 ELECTROCARDIOGRAM TRACING: CPT

## 2022-05-27 PROCEDURE — 36415 COLL VENOUS BLD VENIPUNCTURE: CPT | Performed by: STUDENT IN AN ORGANIZED HEALTH CARE EDUCATION/TRAINING PROGRAM

## 2022-05-27 PROCEDURE — 83880 ASSAY OF NATRIURETIC PEPTIDE: CPT | Performed by: STUDENT IN AN ORGANIZED HEALTH CARE EDUCATION/TRAINING PROGRAM

## 2022-05-27 PROCEDURE — 84484 ASSAY OF TROPONIN QUANT: CPT | Performed by: STUDENT IN AN ORGANIZED HEALTH CARE EDUCATION/TRAINING PROGRAM

## 2022-05-27 PROCEDURE — 85025 COMPLETE CBC W/AUTO DIFF WBC: CPT | Performed by: STUDENT IN AN ORGANIZED HEALTH CARE EDUCATION/TRAINING PROGRAM
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
## 2022-05-27 NOTE — DISCHARGE INSTRUCTIONS
Follow up with your primary care physician.     Continue taking your medications as prescribed.  Call for assistance when attempting to ambulate.    Return to the emergency department if you have any worsening pain, fever, nausea, vomiting, difficulty breathing, headache, or any other symptoms.

## 2022-05-27 NOTE — ED NOTES
"Pt d/c'd to [facility] via [employer], transportation ws setup by Joselyn @ facility who stated "we always use uber/taxi for transport" and verified that she and other staff will be @ the front to assist pt once arrived. NAD noted, pt wheeled to cab  "

## 2022-06-01 ENCOUNTER — LAB REQUISITION (OUTPATIENT)
Dept: LAB | Facility: HOSPITAL | Age: 87
End: 2022-06-01
Payer: MEDICARE

## 2022-06-01 DIAGNOSIS — N39.0 URINARY TRACT INFECTION, SITE NOT SPECIFIED: ICD-10-CM

## 2022-06-01 LAB
APPEARANCE UR: CLEAR
BILIRUB UR QL STRIP.AUTO: NEGATIVE MG/DL
COLOR UR AUTO: ABNORMAL
GLUCOSE UR QL STRIP.AUTO: NEGATIVE MG/DL
KETONES UR QL STRIP.AUTO: NEGATIVE MG/DL
LEUKOCYTE ESTERASE UR QL STRIP.AUTO: NEGATIVE UNIT/L
NITRITE UR QL STRIP.AUTO: NEGATIVE
PH UR STRIP.AUTO: 5.5 [PH]
PROT UR QL STRIP.AUTO: NEGATIVE MG/DL
RBC UR QL AUTO: NEGATIVE UNIT/L
SP GR UR STRIP.AUTO: 1.02
UROBILINOGEN UR STRIP-ACNC: 0.2 MG/DL

## 2022-06-01 PROCEDURE — 81003 URINALYSIS AUTO W/O SCOPE: CPT | Performed by: NURSE PRACTITIONER

## 2022-06-28 ENCOUNTER — HOSPITAL ENCOUNTER (OUTPATIENT)
Dept: RADIOLOGY | Facility: HOSPITAL | Age: 87
Discharge: HOME | End: 2022-06-28
Attending: INTERNAL MEDICINE
Payer: MEDICARE

## 2022-06-28 ENCOUNTER — TELEPHONE (OUTPATIENT)
Dept: NEPHROLOGY | Facility: CLINIC | Age: 87
End: 2022-06-28
Payer: MEDICARE

## 2022-06-28 DIAGNOSIS — E87.5 HYPERKALEMIA: ICD-10-CM

## 2022-06-28 DIAGNOSIS — N18.32 STAGE 3B CHRONIC KIDNEY DISEASE: Primary | ICD-10-CM

## 2022-06-28 PROCEDURE — 76770 US EXAM ABDO BACK WALL COMP: CPT | Mod: TC

## 2022-06-28 NOTE — TELEPHONE ENCOUNTER
Labs reviewed per Dr Bey, Called and spoke with Joselyn, nurse at assisted living to D/C Sodium Bicarbonate, start low Potassium diet and have CMP drawn on 7-5-22, faxed order to nurse. Verbalized understanding.

## 2022-09-22 ENCOUNTER — HISTORICAL (OUTPATIENT)
Dept: ADMINISTRATIVE | Facility: HOSPITAL | Age: 87
End: 2022-09-22
Payer: MEDICARE